# Patient Record
Sex: FEMALE | Race: WHITE | NOT HISPANIC OR LATINO | Employment: OTHER | URBAN - METROPOLITAN AREA
[De-identification: names, ages, dates, MRNs, and addresses within clinical notes are randomized per-mention and may not be internally consistent; named-entity substitution may affect disease eponyms.]

---

## 2017-05-02 ENCOUNTER — HOSPITAL ENCOUNTER (OUTPATIENT)
Facility: HOSPITAL | Age: 77
Setting detail: HOSPITAL OUTPATIENT SURGERY
Discharge: HOME | End: 2017-05-02
Attending: SURGERY | Admitting: SURGERY

## 2017-05-02 ENCOUNTER — ANESTHESIA EVENT (OUTPATIENT)
Dept: GASTROENTEROLOGY | Facility: HOSPITAL | Age: 77
End: 2017-05-02

## 2017-05-02 ENCOUNTER — ANESTHESIA (OUTPATIENT)
Dept: GASTROENTEROLOGY | Facility: HOSPITAL | Age: 77
End: 2017-05-02

## 2017-05-02 VITALS
WEIGHT: 158 LBS | DIASTOLIC BLOOD PRESSURE: 89 MMHG | BODY MASS INDEX: 25.39 KG/M2 | TEMPERATURE: 97.9 F | RESPIRATION RATE: 18 BRPM | HEIGHT: 66 IN | HEART RATE: 63 BPM | OXYGEN SATURATION: 100 % | SYSTOLIC BLOOD PRESSURE: 162 MMHG

## 2017-05-02 PROCEDURE — 25010000002 PROPOFOL 10 MG/ML EMULSION: Performed by: ANESTHESIOLOGY

## 2017-05-02 RX ORDER — PROPOFOL 10 MG/ML
VIAL (ML) INTRAVENOUS AS NEEDED
Status: DISCONTINUED | OUTPATIENT
Start: 2017-05-02 | End: 2017-05-02 | Stop reason: SURG

## 2017-05-02 RX ORDER — PROPOFOL 10 MG/ML
VIAL (ML) INTRAVENOUS CONTINUOUS PRN
Status: DISCONTINUED | OUTPATIENT
Start: 2017-05-02 | End: 2017-05-02 | Stop reason: SURG

## 2017-05-02 RX ORDER — SODIUM CHLORIDE, SODIUM LACTATE, POTASSIUM CHLORIDE, CALCIUM CHLORIDE 600; 310; 30; 20 MG/100ML; MG/100ML; MG/100ML; MG/100ML
1000 INJECTION, SOLUTION INTRAVENOUS CONTINUOUS PRN
Status: DISCONTINUED | OUTPATIENT
Start: 2017-05-02 | End: 2017-05-02 | Stop reason: HOSPADM

## 2017-05-02 RX ORDER — LIDOCAINE HYDROCHLORIDE 20 MG/ML
INJECTION, SOLUTION INFILTRATION; PERINEURAL AS NEEDED
Status: DISCONTINUED | OUTPATIENT
Start: 2017-05-02 | End: 2017-05-02 | Stop reason: SURG

## 2017-05-02 RX ADMIN — LIDOCAINE HYDROCHLORIDE 50 MG: 20 INJECTION, SOLUTION INFILTRATION; PERINEURAL at 09:59

## 2017-05-02 RX ADMIN — SODIUM CHLORIDE, POTASSIUM CHLORIDE, SODIUM LACTATE AND CALCIUM CHLORIDE 1000 ML: 600; 310; 30; 20 INJECTION, SOLUTION INTRAVENOUS at 09:13

## 2017-05-02 RX ADMIN — PROPOFOL 125 MCG/KG/MIN: 10 INJECTION, EMULSION INTRAVENOUS at 10:05

## 2017-05-02 RX ADMIN — PROPOFOL 75 MG: 10 INJECTION, EMULSION INTRAVENOUS at 10:05

## 2017-05-18 ENCOUNTER — TRANSCRIBE ORDERS (OUTPATIENT)
Dept: ADMINISTRATIVE | Facility: HOSPITAL | Age: 77
End: 2017-05-18

## 2017-05-18 DIAGNOSIS — Z78.0 POSTMENOPAUSAL: Primary | ICD-10-CM

## 2017-06-06 ENCOUNTER — HOSPITAL ENCOUNTER (OUTPATIENT)
Dept: BONE DENSITY | Facility: HOSPITAL | Age: 77
Discharge: HOME | End: 2017-06-06
Attending: INTERNAL MEDICINE | Admitting: INTERNAL MEDICINE

## 2017-06-06 DIAGNOSIS — Z78.0 POSTMENOPAUSAL: ICD-10-CM

## 2017-06-06 PROCEDURE — 77080 DXA BONE DENSITY AXIAL: CPT

## 2019-06-03 ENCOUNTER — HOSPITAL ENCOUNTER (EMERGENCY)
Facility: HOSPITAL | Age: 79
Discharge: HOME | End: 2019-06-03
Attending: EMERGENCY MEDICINE | Admitting: EMERGENCY MEDICINE

## 2019-06-03 VITALS
RESPIRATION RATE: 16 BRPM | SYSTOLIC BLOOD PRESSURE: 119 MMHG | OXYGEN SATURATION: 95 % | HEART RATE: 69 BPM | TEMPERATURE: 98.1 F | BODY MASS INDEX: 25.66 KG/M2 | HEIGHT: 66 IN | DIASTOLIC BLOOD PRESSURE: 76 MMHG

## 2019-06-03 DIAGNOSIS — R09.89 FOREIGN BODY SENSATION IN THROAT: Primary | ICD-10-CM

## 2019-06-03 PROCEDURE — 99283 EMERGENCY DEPT VISIT LOW MDM: CPT

## 2019-06-04 NOTE — DISCHARGE INSTRUCTIONS
Continue current home medications  Soft diet for the next 2 days   Follow up with pmd   Return to er for fever, chills, vomiting, inability to handle po fluids, shortness of air or any new or worsening symptoms

## 2019-06-04 NOTE — ED PROVIDER NOTES
"  EMERGENCY DEPARTMENT ENCOUNTER    CHIEF COMPLAINT  Chief Complaint: trouble swallowing   History given by: patient,   History limited by:nothing   Time Seen: 2213  Room Number: 28/28  PMD: Anyi Bartholomew MD      HPI:  Pt is a 79 y.o. female who presents with trouble swallowing that occurred tonight around 2030 after she ate a piece of steak. Pt states \"I knew the piece was too big and I took a bite anyway\".  Per pt's , prior to arrival she was unable to swallow anything. Patient denies CP, SOA, fever, and chills. There are no other complaints at this time.   Past Medical History of GIST, elevated cholesterol, murmur, cataract, gastric reflux, hiatal hernia, diverticular disease, and arthritis.    Duration: 2 hours   Timing: sudden   Location: throat   Radiation: n/a  Quality: \"I knew the piece was too big and I took a bite anyway\"   Intensity/Severity: moderate   Progression: unchanged   Associated Symptoms: none mentioned   Aggravating Factors: per pt's , prior to arrival pt was unable to swallow anything  Alleviating Factors: none mentioned   Previous Episodes: none  Treatment before arrival: none     PAST MEDICAL HISTORY  Active Ambulatory Problems     Diagnosis Date Noted   • Primary localized osteoarthritis of right knee 08/10/2016     Resolved Ambulatory Problems     Diagnosis Date Noted   • No Resolved Ambulatory Problems     Past Medical History:   Diagnosis Date   • Arthritis    • Cataract    • Diverticular disease    • Elevated cholesterol    • Gastric reflux    • GIST (gastrointestinal stromal tumor), malignant (CMS/HCC)    • Hiatal hernia    • Insomnia    • Knee pain, right    • Murmur    • Pinched nerve        PAST SURGICAL HISTORY  Past Surgical History:   Procedure Laterality Date   • COLONOSCOPY      2012   • COLONOSCOPY N/A 5/2/2017    Procedure: COLONOSCOPY to cecum;  Surgeon: Gilmer Shen MD;  Location: [facility] ENDOSCOPY;  Service:    • ENDOSCOPY     • EYE SURGERY   "   • HYSTERECTOMY     • KNEE ARTHROSCOPY Right     REPAIR OF TORN MENISCUS   • MO TOTAL KNEE ARTHROPLASTY Right 8/10/2016    Procedure: RT TOTAL KNEE ARTHROPLASTY;  Surgeon: Nima Amaya MD;  Location: [facility];  Service: Orthopedics   • TEETH EXTRACTION     • TONSILLECTOMY     • TUMOR EXCISION      MALIGNANT GIST TUMOR REMOVED   • VAGINAL DELIVERY      x3       FAMILY HISTORY  No family history on file.    SOCIAL HISTORY  Social History     Socioeconomic History   • Marital status:      Spouse name: Not on file   • Number of children: Not on file   • Years of education: Not on file   • Highest education level: Not on file   Tobacco Use   • Smoking status: Never Smoker   • Smokeless tobacco: Never Used   Substance and Sexual Activity   • Alcohol use: Yes     Comment: 2 GLASSES NIGHTLY   • Drug use: No   • Sexual activity: Defer         ALLERGIES  Penicillins    REVIEW OF SYSTEMS  Review of Systems   Constitutional: Negative for chills and fever.   HENT: Positive for trouble swallowing. Negative for sore throat.    Respiratory: Negative for cough and shortness of breath.    Cardiovascular: Negative for chest pain.   Gastrointestinal: Negative for nausea and vomiting.   Genitourinary: Negative for dysuria.   Musculoskeletal: Negative for back pain.   Psychiatric/Behavioral: The patient is not nervous/anxious.        PHYSICAL EXAM  ED Triage Vitals   Temp Heart Rate Resp BP SpO2   06/03/19 2159 06/03/19 2159 06/03/19 2159 06/03/19 2208 06/03/19 2159   97.9 °F (36.6 °C) 103 17 142/83 95 %         Physical Exam   Constitutional: She is well-developed, well-nourished, and in no distress.   Pt is speaking in full sentences and handling secretions.   HENT:   Head: Normocephalic.   Mouth/Throat: Mucous membranes are normal.   Eyes: No scleral icterus.   Neck: Normal range of motion.   Cardiovascular: Normal rate, regular rhythm and normal heart sounds.   Pulmonary/Chest: Effort normal and breath sounds  "normal.   Musculoskeletal: Normal range of motion.   Neurological: She is alert.   Skin: Skin is warm and dry.   Psychiatric: Mood and affect normal.   Nursing note and vitals reviewed.      MEDICAL RECORD REVIEW  Pt's previous medical records show pt was seen at [facility] on 5/14/2019 and dx with URTI.       PROGRESS AND CONSULTS    2214  Pt is handling secretions w/o any issues.    2234  Rechecked pt. Pt states she feels fine now. Advised pt to RTER if any new/worsening symptoms develop. Discussed the plan to discharge. Pt understands and agrees with the plan, all questions answered.    2245  Reviewed pt's history and workup with Dr. Davila (ER physician).  At bedside evaluation, they agree with the plan of care.    Reviewed implications of results, diagnosis, meds, responsibility to follow up, warning signs and symptoms of possible worsening, potential complications and reasons to return to ER with patient.  Discussed all results and noted any abnormalities with patient.  Discussed absolute need to recheck abnormalities with PMD.    Discussed plan for discharge, as there is no emergent indication for admission.  Pt is agreeable and understands need for follow up and repeat testing.  Pt is aware that discharge does not mean that nothing is wrong but it indicates no emergency is present.  Pt is discharged with instructions to follow up with primary care doctor to have their blood pressure rechecked.       DIAGNOSIS  Final diagnoses:   Foreign body sensation in throat       FOLLOW UP   Anyi Bartholomew MD  [facility] [place] [address]  [phone]    In 1 day          MEDICATIONS GIVEN IN ER  Medications - No data to display    Pulse 103   Temp 97.9 °F (36.6 °C) (Tympanic)   Resp 17   Ht 167.6 cm (66\")   SpO2 95%   BMI 25.66 kg/m²       I personally reviewed the past medical history, past surgical history, social history, family history, current medications and allergies as they appear in this " chart.  The scribe's note accurately reflects the work and decisions made by me.     Documentation assistance provided by jazmine Barrios for JOSE Wright on 6/3/2019 at 10:49 PM. Information recorded by the scribe was done at my direction and has been verified and validated by me.       Deann Barrios  06/03/19 [id number]       Jessica Crowe APRN  06/03/19 6147

## 2019-06-04 NOTE — ED PROVIDER NOTES
Pt presents to the ED complaining of food bolus in throat that began at dinner tonight. Per pt, she thought she had a piece of steak stuck in throat and was unable to tolerate PO. Pt denies SOA, states symptoms have since resolved, but she now has a sore throat.     On exam, pt is resting comfortably in NAD, able to tolerate PO clear and secretions.     I agree with midlevel plan to discharge pt due to pt's ability to tolerate PO and resolve of food bolus. Pt instructed to rest esophagus and stick to soft foods. Pt given RTER instructions and I agreed to have pt follow up with GI for endoscopy within the month. Pt understands and agrees with the plan, all questions answered.    The CLAIR and I have discussed this patient's history, physical exam, and treatment plan.  I have reviewed the documentation and personally had a face to face interaction with the patient. I affirm the documentation and agree with the treatment and plan.  The attached note describes my personal findings.    Documentation assistance provided by jazmine Trammell for Dr. Davila.  Information recorded by the jazmine was done at my direction and has been verified and validated by me.       Jessica Trammell  06/03/19 8619       Nima Davila MD  06/03/19 [id number]

## 2019-06-04 NOTE — ED NOTES
Pt states she believes a piece of steak is in her throat, ate around 2030. Airway patent, no SOA, unable to swallow water.      Kyara Florentino RN  06/03/19 [id number]

## 2019-07-10 ENCOUNTER — OFFICE (OUTPATIENT)
Dept: URBAN - METROPOLITAN AREA CLINIC 75 | Facility: CLINIC | Age: 79
End: 2019-07-10

## 2019-07-10 VITALS
HEIGHT: 66 IN | DIASTOLIC BLOOD PRESSURE: 60 MMHG | WEIGHT: 160 LBS | HEART RATE: 90 BPM | SYSTOLIC BLOOD PRESSURE: 140 MMHG

## 2019-07-10 DIAGNOSIS — R13.10 DYSPHAGIA, UNSPECIFIED: ICD-10-CM

## 2019-07-10 PROCEDURE — 99204 OFFICE O/P NEW MOD 45 MIN: CPT | Performed by: INTERNAL MEDICINE

## 2019-07-11 VITALS
DIASTOLIC BLOOD PRESSURE: 84 MMHG | RESPIRATION RATE: 11 BRPM | RESPIRATION RATE: 22 BRPM | HEART RATE: 87 BPM | OXYGEN SATURATION: 98 % | OXYGEN SATURATION: 94 % | TEMPERATURE: 96.9 F | OXYGEN SATURATION: 95 % | DIASTOLIC BLOOD PRESSURE: 86 MMHG | OXYGEN SATURATION: 96 % | HEART RATE: 37 BPM | HEART RATE: 66 BPM | SYSTOLIC BLOOD PRESSURE: 144 MMHG | WEIGHT: 158 LBS | SYSTOLIC BLOOD PRESSURE: 154 MMHG | RESPIRATION RATE: 16 BRPM | TEMPERATURE: 97.6 F | SYSTOLIC BLOOD PRESSURE: 148 MMHG | DIASTOLIC BLOOD PRESSURE: 94 MMHG | HEART RATE: 62 BPM | HEART RATE: 65 BPM | RESPIRATION RATE: 12 BRPM | HEIGHT: 66 IN | OXYGEN SATURATION: 100 % | DIASTOLIC BLOOD PRESSURE: 80 MMHG | HEART RATE: 81 BPM | RESPIRATION RATE: 14 BRPM

## 2019-07-12 ENCOUNTER — OFFICE (OUTPATIENT)
Dept: URBAN - METROPOLITAN AREA PATHOLOGY 4 | Facility: PATHOLOGY | Age: 79
End: 2019-07-12

## 2019-07-12 ENCOUNTER — AMBULATORY SURGICAL CENTER (OUTPATIENT)
Dept: URBAN - METROPOLITAN AREA SURGERY 17 | Facility: SURGERY | Age: 79
End: 2019-07-12

## 2019-07-12 DIAGNOSIS — K21.0 GASTRO-ESOPHAGEAL REFLUX DISEASE WITH ESOPHAGITIS: ICD-10-CM

## 2019-07-12 DIAGNOSIS — K20.0 EOSINOPHILIC ESOPHAGITIS: ICD-10-CM

## 2019-07-12 DIAGNOSIS — K22.2 ESOPHAGEAL OBSTRUCTION: ICD-10-CM

## 2019-07-12 DIAGNOSIS — K44.9 DIAPHRAGMATIC HERNIA WITHOUT OBSTRUCTION OR GANGRENE: ICD-10-CM

## 2019-07-12 DIAGNOSIS — R13.10 DYSPHAGIA, UNSPECIFIED: ICD-10-CM

## 2019-07-12 PROBLEM — K20.9 ESOPHAGITIS, UNSPECIFIED: Status: ACTIVE | Noted: 2019-07-12

## 2019-07-12 LAB
GI HISTOLOGY: A. SELECT: (no result)
GI HISTOLOGY: B. UNSPECIFIED: (no result)
GI HISTOLOGY: PDF REPORT: (no result)

## 2019-07-12 PROCEDURE — 88312 SPECIAL STAINS GROUP 1: CPT | Performed by: INTERNAL MEDICINE

## 2019-07-12 PROCEDURE — 88305 TISSUE EXAM BY PATHOLOGIST: CPT | Performed by: INTERNAL MEDICINE

## 2019-07-12 PROCEDURE — 43239 EGD BIOPSY SINGLE/MULTIPLE: CPT | Performed by: INTERNAL MEDICINE

## 2020-01-14 ENCOUNTER — APPOINTMENT (OUTPATIENT)
Dept: PREADMISSION TESTING | Facility: HOSPITAL | Age: 80
End: 2020-01-14

## 2020-01-14 VITALS
OXYGEN SATURATION: 98 % | HEIGHT: 67 IN | DIASTOLIC BLOOD PRESSURE: 69 MMHG | WEIGHT: 165 LBS | SYSTOLIC BLOOD PRESSURE: 149 MMHG | HEART RATE: 66 BPM | RESPIRATION RATE: 16 BRPM | BODY MASS INDEX: 25.9 KG/M2 | TEMPERATURE: 98 F

## 2020-01-14 LAB
ANION GAP SERPL CALCULATED.3IONS-SCNC: 14.8 MMOL/L (ref 5–15)
BASOPHILS # BLD AUTO: 0.04 10*3/MM3 (ref 0–0.2)
BASOPHILS NFR BLD AUTO: 0.6 % (ref 0–1.5)
BUN BLD-MCNC: 19 MG/DL (ref 8–23)
BUN/CREAT SERPL: 22.4 (ref 7–25)
CALCIUM SPEC-SCNC: 9.6 MG/DL (ref 8.6–10.5)
CHLORIDE SERPL-SCNC: 100 MMOL/L (ref 98–107)
CO2 SERPL-SCNC: 24.2 MMOL/L (ref 22–29)
CREAT BLD-MCNC: 0.85 MG/DL (ref 0.57–1)
DEPRECATED RDW RBC AUTO: 42.7 FL (ref 37–54)
EOSINOPHIL # BLD AUTO: 0.24 10*3/MM3 (ref 0–0.4)
EOSINOPHIL NFR BLD AUTO: 3.4 % (ref 0.3–6.2)
ERYTHROCYTE [DISTWIDTH] IN BLOOD BY AUTOMATED COUNT: 12.4 % (ref 12.3–15.4)
GFR SERPL CREATININE-BSD FRML MDRD: 64 ML/MIN/1.73
GLUCOSE BLD-MCNC: 103 MG/DL (ref 65–99)
HCT VFR BLD AUTO: 39.4 % (ref 34–46.6)
HGB BLD-MCNC: 13.2 G/DL (ref 12–15.9)
IMM GRANULOCYTES # BLD AUTO: 0.03 10*3/MM3 (ref 0–0.05)
IMM GRANULOCYTES NFR BLD AUTO: 0.4 % (ref 0–0.5)
LYMPHOCYTES # BLD AUTO: 2.53 10*3/MM3 (ref 0.7–3.1)
LYMPHOCYTES NFR BLD AUTO: 35.8 % (ref 19.6–45.3)
MCH RBC QN AUTO: 31.4 PG (ref 26.6–33)
MCHC RBC AUTO-ENTMCNC: 33.5 G/DL (ref 31.5–35.7)
MCV RBC AUTO: 93.8 FL (ref 79–97)
MONOCYTES # BLD AUTO: 0.78 10*3/MM3 (ref 0.1–0.9)
MONOCYTES NFR BLD AUTO: 11 % (ref 5–12)
NEUTROPHILS # BLD AUTO: 3.44 10*3/MM3 (ref 1.7–7)
NEUTROPHILS NFR BLD AUTO: 48.8 % (ref 42.7–76)
NRBC BLD AUTO-RTO: 0 /100 WBC (ref 0–0.2)
PLATELET # BLD AUTO: 226 10*3/MM3 (ref 140–450)
PMV BLD AUTO: 9.5 FL (ref 6–12)
POTASSIUM BLD-SCNC: 4.1 MMOL/L (ref 3.5–5.2)
RBC # BLD AUTO: 4.2 10*6/MM3 (ref 3.77–5.28)
SODIUM BLD-SCNC: 139 MMOL/L (ref 136–145)
WBC NRBC COR # BLD: 7.06 10*3/MM3 (ref 3.4–10.8)

## 2020-01-14 PROCEDURE — 85025 COMPLETE CBC W/AUTO DIFF WBC: CPT | Performed by: OBSTETRICS & GYNECOLOGY

## 2020-01-14 PROCEDURE — 93005 ELECTROCARDIOGRAM TRACING: CPT

## 2020-01-14 PROCEDURE — 80048 BASIC METABOLIC PNL TOTAL CA: CPT | Performed by: OBSTETRICS & GYNECOLOGY

## 2020-01-14 PROCEDURE — 36415 COLL VENOUS BLD VENIPUNCTURE: CPT

## 2020-01-14 PROCEDURE — 93010 ELECTROCARDIOGRAM REPORT: CPT | Performed by: INTERNAL MEDICINE

## 2020-01-14 RX ORDER — ASPIRIN 325 MG
325 TABLET ORAL AS NEEDED
COMMUNITY

## 2020-01-14 RX ORDER — VIT A/VIT C/VIT E/ZINC/COPPER 2148-113
1 TABLET ORAL EVERY MORNING
COMMUNITY

## 2020-01-14 NOTE — DISCHARGE INSTRUCTIONS
Take the following medications the morning of surgery:    NONE    ARRIVE AT 5:45    General Instructions:  • Do not eat solid food after midnight the night before surgery.  • You may drink clear liquids day of surgery but must stop at least one hour before your hospital arrival time.  • It is beneficial for you to have a clear drink that contains carbohydrates the day of surgery.  We suggest a 12 to 20 ounce bottle of Gatorade or Powerade for non-diabetic patients or a 12 to 20 ounce bottle of G2 or Powerade Zero for diabetic patients. (Pediatric patients, are not advised to drink a 12 to 20 ounce carbohydrate drink)    Clear liquids are liquids you can see through.  Nothing red in color.     Plain water                               Sports drinks  Sodas                                   Gelatin (Jell-O)  Fruit juices without pulp such as white grape juice and apple juice  Popsicles that contain no fruit or yogurt  Tea or coffee (no cream or milk added)  Gatorade / Powerade  G2 / Powerade Zero    • Infants may have breast milk up to four hours before surgery.  • Infants drinking formula may drink formula up to six hours before surgery.   • Patients who avoid smoking, chewing tobacco and alcohol for 4 weeks prior to surgery have a reduced risk of post-operative complications.  Quit smoking as many days before surgery as you can.  • Do not smoke, use chewing tobacco or drink alcohol the day of surgery.   • If applicable bring your C-PAP/ BI-PAP machine.  • Bring any papers given to you in the doctor’s office.  • Wear clean comfortable clothes.  • Do not wear contact lenses, false eyelashes or make-up.  Bring a case for your glasses.   • Bring crutches or walker if applicable.  • Remove all piercings.  Leave jewelry and any other valuables at home.  • Hair extensions with metal clips must be removed prior to surgery.  • The Pre-Admission Testing nurse will instruct you to bring medications if unable to obtain an  accurate list in Pre-Admission Testing.        If you were given a blood bank ID arm band remember to bring it with you the day of surgery.    Preventing a Surgical Site Infection:  • For 2 to 3 days before surgery, avoid shaving with a razor because the razor can irritate skin and make it easier to develop an infection.    • Any areas of open skin can increase the risk of a post-operative wound infection by allowing bacteria to enter and travel throughout the body.  Notify your surgeon if you have any skin wounds / rashes even if it is not near the expected surgical site.  The area will need assessed to determine if surgery should be delayed until it is healed.  • The night prior to surgery sleep in a clean bed with clean clothing.  Do not allow pets to sleep with you.  • Shower on the morning of surgery using a fresh bar of anti-bacterial soap (such as Dial) and clean washcloth.  Dry with a clean towel and dress in clean clothing.  • Ask your surgeon if you will be receiving antibiotics prior to surgery.  • Make sure you, your family, and all healthcare providers clean their hands with soap and water or an alcohol based hand  before caring for you or your wound.    Day of surgery:  Your arrival time is approximately two hours before your scheduled surgery time.  Upon arrival, a Pre-op nurse and Anesthesiologist will review your health history, obtain vital signs, and answer questions you may have.  The only belongings needed at this time will be a list of your home medications and if applicable your C-PAP/BI-PAP machine.  If you are staying overnight your family can leave the rest of your belongings in the car and bring them to your room later.  A Pre-op nurse will start an IV and you may receive medication in preparation for surgery, including something to help you relax.  Your family will be able to see you in the Pre-op area.  Two visitors at a time will be allowed in the Pre-op room.  While you are in  surgery your family should notify the waiting room  if they leave the waiting room area and provide a contact phone number.    Please be aware that surgery does come with discomfort.  We want to make every effort to control your discomfort so please discuss any uncontrolled symptoms with your nurse.   Your doctor will most likely have prescribed pain medications.      If you are going home after surgery you will receive individualized written care instructions before being discharged.  A responsible adult must drive you to and from the hospital on the day of your surgery and stay with you for 24 hours.    If you are staying overnight following surgery, you will be transported to your hospital room following the recovery period.  [facility] has all private rooms.    If you have any questions please call Pre-Admission Testing at [phone].  Deductibles and co-payments are collected on the day of service. Please be prepared to pay the required co-pay, deductible or deposit on the day of service as defined by your plan.

## 2020-01-20 ENCOUNTER — ANESTHESIA (OUTPATIENT)
Dept: PERIOP | Facility: HOSPITAL | Age: 80
End: 2020-01-20

## 2020-01-20 ENCOUNTER — HOSPITAL ENCOUNTER (OUTPATIENT)
Facility: HOSPITAL | Age: 80
Setting detail: HOSPITAL OUTPATIENT SURGERY
Discharge: HOME | End: 2020-01-20
Attending: OBSTETRICS & GYNECOLOGY | Admitting: OBSTETRICS & GYNECOLOGY

## 2020-01-20 ENCOUNTER — ANESTHESIA EVENT (OUTPATIENT)
Dept: PERIOP | Facility: HOSPITAL | Age: 80
End: 2020-01-20

## 2020-01-20 VITALS
DIASTOLIC BLOOD PRESSURE: 83 MMHG | HEART RATE: 68 BPM | SYSTOLIC BLOOD PRESSURE: 119 MMHG | RESPIRATION RATE: 16 BRPM | TEMPERATURE: 97.9 F | OXYGEN SATURATION: 94 %

## 2020-01-20 DIAGNOSIS — R19.00 PELVIC MASS: ICD-10-CM

## 2020-01-20 PROBLEM — N94.89 ADNEXAL MASS: Status: ACTIVE | Noted: 2020-01-20

## 2020-01-20 PROCEDURE — 25010000002 PROPOFOL 10 MG/ML EMULSION: Performed by: NURSE ANESTHETIST, CERTIFIED REGISTERED

## 2020-01-20 PROCEDURE — 25010000002 HYDROMORPHONE PER 4 MG: Performed by: ANESTHESIOLOGY

## 2020-01-20 PROCEDURE — 88307 TISSUE EXAM BY PATHOLOGIST: CPT | Performed by: OBSTETRICS & GYNECOLOGY

## 2020-01-20 PROCEDURE — 25010000002 FENTANYL CITRATE (PF) 100 MCG/2ML SOLUTION: Performed by: ANESTHESIOLOGY

## 2020-01-20 PROCEDURE — 25010000002 ROPIVACAINE PER 1 MG: Performed by: OBSTETRICS & GYNECOLOGY

## 2020-01-20 PROCEDURE — 88342 IMHCHEM/IMCYTCHM 1ST ANTB: CPT | Performed by: OBSTETRICS & GYNECOLOGY

## 2020-01-20 PROCEDURE — 88341 IMHCHEM/IMCYTCHM EA ADD ANTB: CPT | Performed by: OBSTETRICS & GYNECOLOGY

## 2020-01-20 PROCEDURE — 25010000002 FENTANYL CITRATE (PF) 100 MCG/2ML SOLUTION: Performed by: NURSE ANESTHETIST, CERTIFIED REGISTERED

## 2020-01-20 PROCEDURE — 25010000002 ONDANSETRON PER 1 MG: Performed by: NURSE ANESTHETIST, CERTIFIED REGISTERED

## 2020-01-20 PROCEDURE — 25010000002 DEXAMETHASONE PER 1 MG: Performed by: NURSE ANESTHETIST, CERTIFIED REGISTERED

## 2020-01-20 PROCEDURE — 25010000002 MIDAZOLAM PER 1 MG: Performed by: ANESTHESIOLOGY

## 2020-01-20 RX ORDER — HYDRALAZINE HYDROCHLORIDE 20 MG/ML
5 INJECTION INTRAMUSCULAR; INTRAVENOUS
Status: DISCONTINUED | OUTPATIENT
Start: 2020-01-20 | End: 2020-01-20 | Stop reason: HOSPADM

## 2020-01-20 RX ORDER — ONDANSETRON 2 MG/ML
INJECTION INTRAMUSCULAR; INTRAVENOUS AS NEEDED
Status: DISCONTINUED | OUTPATIENT
Start: 2020-01-20 | End: 2020-01-20 | Stop reason: SURG

## 2020-01-20 RX ORDER — HYDROCODONE BITARTRATE AND ACETAMINOPHEN 7.5; 325 MG/1; MG/1
1 TABLET ORAL ONCE AS NEEDED
Status: COMPLETED | OUTPATIENT
Start: 2020-01-20 | End: 2020-01-20

## 2020-01-20 RX ORDER — ROCURONIUM BROMIDE 10 MG/ML
INJECTION, SOLUTION INTRAVENOUS AS NEEDED
Status: DISCONTINUED | OUTPATIENT
Start: 2020-01-20 | End: 2020-01-20 | Stop reason: SURG

## 2020-01-20 RX ORDER — MIDAZOLAM HYDROCHLORIDE 1 MG/ML
2 INJECTION INTRAMUSCULAR; INTRAVENOUS
Status: DISCONTINUED | OUTPATIENT
Start: 2020-01-20 | End: 2020-01-20 | Stop reason: HOSPADM

## 2020-01-20 RX ORDER — MIDAZOLAM HYDROCHLORIDE 1 MG/ML
1 INJECTION INTRAMUSCULAR; INTRAVENOUS
Status: DISCONTINUED | OUTPATIENT
Start: 2020-01-20 | End: 2020-01-20 | Stop reason: HOSPADM

## 2020-01-20 RX ORDER — PROMETHAZINE HYDROCHLORIDE 25 MG/1
25 TABLET ORAL ONCE AS NEEDED
Status: DISCONTINUED | OUTPATIENT
Start: 2020-01-20 | End: 2020-01-20 | Stop reason: HOSPADM

## 2020-01-20 RX ORDER — FENTANYL CITRATE 50 UG/ML
50 INJECTION, SOLUTION INTRAMUSCULAR; INTRAVENOUS
Status: DISCONTINUED | OUTPATIENT
Start: 2020-01-20 | End: 2020-01-20 | Stop reason: HOSPADM

## 2020-01-20 RX ORDER — FLUMAZENIL 0.1 MG/ML
0.2 INJECTION INTRAVENOUS AS NEEDED
Status: DISCONTINUED | OUTPATIENT
Start: 2020-01-20 | End: 2020-01-20 | Stop reason: HOSPADM

## 2020-01-20 RX ORDER — LIDOCAINE HYDROCHLORIDE 10 MG/ML
0.5 INJECTION, SOLUTION EPIDURAL; INFILTRATION; INTRACAUDAL; PERINEURAL ONCE AS NEEDED
Status: DISCONTINUED | OUTPATIENT
Start: 2020-01-20 | End: 2020-01-20 | Stop reason: HOSPADM

## 2020-01-20 RX ORDER — FENTANYL CITRATE 50 UG/ML
INJECTION, SOLUTION INTRAMUSCULAR; INTRAVENOUS AS NEEDED
Status: DISCONTINUED | OUTPATIENT
Start: 2020-01-20 | End: 2020-01-20 | Stop reason: SURG

## 2020-01-20 RX ORDER — PROMETHAZINE HYDROCHLORIDE 25 MG/ML
6.25 INJECTION, SOLUTION INTRAMUSCULAR; INTRAVENOUS ONCE AS NEEDED
Status: DISCONTINUED | OUTPATIENT
Start: 2020-01-20 | End: 2020-01-20 | Stop reason: HOSPADM

## 2020-01-20 RX ORDER — CEFAZOLIN SODIUM 2 G/100ML
2 INJECTION, SOLUTION INTRAVENOUS ONCE
Status: DISCONTINUED | OUTPATIENT
Start: 2020-01-20 | End: 2020-01-20

## 2020-01-20 RX ORDER — FAMOTIDINE 10 MG/ML
20 INJECTION, SOLUTION INTRAVENOUS ONCE
Status: COMPLETED | OUTPATIENT
Start: 2020-01-20 | End: 2020-01-20

## 2020-01-20 RX ORDER — ONDANSETRON 2 MG/ML
4 INJECTION INTRAMUSCULAR; INTRAVENOUS ONCE AS NEEDED
Status: DISCONTINUED | OUTPATIENT
Start: 2020-01-20 | End: 2020-01-20 | Stop reason: HOSPADM

## 2020-01-20 RX ORDER — HYDROMORPHONE HYDROCHLORIDE 1 MG/ML
0.5 INJECTION, SOLUTION INTRAMUSCULAR; INTRAVENOUS; SUBCUTANEOUS
Status: DISCONTINUED | OUTPATIENT
Start: 2020-01-20 | End: 2020-01-20 | Stop reason: HOSPADM

## 2020-01-20 RX ORDER — SODIUM CHLORIDE, SODIUM LACTATE, POTASSIUM CHLORIDE, CALCIUM CHLORIDE 600; 310; 30; 20 MG/100ML; MG/100ML; MG/100ML; MG/100ML
9 INJECTION, SOLUTION INTRAVENOUS CONTINUOUS
Status: DISCONTINUED | OUTPATIENT
Start: 2020-01-20 | End: 2020-01-20 | Stop reason: HOSPADM

## 2020-01-20 RX ORDER — DEXAMETHASONE SODIUM PHOSPHATE 10 MG/ML
INJECTION INTRAMUSCULAR; INTRAVENOUS AS NEEDED
Status: DISCONTINUED | OUTPATIENT
Start: 2020-01-20 | End: 2020-01-20 | Stop reason: SURG

## 2020-01-20 RX ORDER — EPHEDRINE SULFATE 50 MG/ML
5 INJECTION, SOLUTION INTRAVENOUS ONCE AS NEEDED
Status: DISCONTINUED | OUTPATIENT
Start: 2020-01-20 | End: 2020-01-20 | Stop reason: HOSPADM

## 2020-01-20 RX ORDER — LIDOCAINE HYDROCHLORIDE 20 MG/ML
INJECTION, SOLUTION INFILTRATION; PERINEURAL AS NEEDED
Status: DISCONTINUED | OUTPATIENT
Start: 2020-01-20 | End: 2020-01-20 | Stop reason: SURG

## 2020-01-20 RX ORDER — SODIUM CHLORIDE 0.9 % (FLUSH) 0.9 %
3-10 SYRINGE (ML) INJECTION AS NEEDED
Status: DISCONTINUED | OUTPATIENT
Start: 2020-01-20 | End: 2020-01-20 | Stop reason: HOSPADM

## 2020-01-20 RX ORDER — CLINDAMYCIN PHOSPHATE 600 MG/50ML
600 INJECTION INTRAVENOUS ONCE
Status: COMPLETED | OUTPATIENT
Start: 2020-01-20 | End: 2020-01-20

## 2020-01-20 RX ORDER — FENTANYL CITRATE 50 UG/ML
100 INJECTION, SOLUTION INTRAMUSCULAR; INTRAVENOUS
Status: DISCONTINUED | OUTPATIENT
Start: 2020-01-20 | End: 2020-01-20 | Stop reason: HOSPADM

## 2020-01-20 RX ORDER — SODIUM CHLORIDE 0.9 % (FLUSH) 0.9 %
3 SYRINGE (ML) INJECTION EVERY 12 HOURS SCHEDULED
Status: DISCONTINUED | OUTPATIENT
Start: 2020-01-20 | End: 2020-01-20 | Stop reason: HOSPADM

## 2020-01-20 RX ORDER — PROMETHAZINE HYDROCHLORIDE 25 MG/1
25 SUPPOSITORY RECTAL ONCE AS NEEDED
Status: DISCONTINUED | OUTPATIENT
Start: 2020-01-20 | End: 2020-01-20 | Stop reason: HOSPADM

## 2020-01-20 RX ORDER — PROPOFOL 10 MG/ML
VIAL (ML) INTRAVENOUS AS NEEDED
Status: DISCONTINUED | OUTPATIENT
Start: 2020-01-20 | End: 2020-01-20 | Stop reason: SURG

## 2020-01-20 RX ORDER — ROPIVACAINE HYDROCHLORIDE 5 MG/ML
INJECTION, SOLUTION EPIDURAL; INFILTRATION; PERINEURAL AS NEEDED
Status: DISCONTINUED | OUTPATIENT
Start: 2020-01-20 | End: 2020-01-20 | Stop reason: HOSPADM

## 2020-01-20 RX ORDER — OXYCODONE AND ACETAMINOPHEN 7.5; 325 MG/1; MG/1
1 TABLET ORAL EVERY 4 HOURS PRN
Status: DISCONTINUED | OUTPATIENT
Start: 2020-01-20 | End: 2020-01-20 | Stop reason: HOSPADM

## 2020-01-20 RX ADMIN — MIDAZOLAM 1 MG: 1 INJECTION INTRAMUSCULAR; INTRAVENOUS at 06:46

## 2020-01-20 RX ADMIN — FENTANYL CITRATE 25 MCG: 50 INJECTION INTRAMUSCULAR; INTRAVENOUS at 07:10

## 2020-01-20 RX ADMIN — FENTANYL CITRATE 50 MCG: 50 INJECTION, SOLUTION INTRAMUSCULAR; INTRAVENOUS at 08:18

## 2020-01-20 RX ADMIN — LIDOCAINE HYDROCHLORIDE 100 MG: 20 INJECTION, SOLUTION INFILTRATION; PERINEURAL at 06:56

## 2020-01-20 RX ADMIN — FENTANYL CITRATE 50 MCG: 50 INJECTION, SOLUTION INTRAMUSCULAR; INTRAVENOUS at 08:26

## 2020-01-20 RX ADMIN — ROCURONIUM BROMIDE 40 MG: 10 INJECTION, SOLUTION INTRAVENOUS at 06:56

## 2020-01-20 RX ADMIN — ONDANSETRON HYDROCHLORIDE 4 MG: 2 SOLUTION INTRAMUSCULAR; INTRAVENOUS at 07:43

## 2020-01-20 RX ADMIN — HYDROCODONE BITARTRATE AND ACETAMINOPHEN 1 TABLET: 7.5; 325 TABLET ORAL at 08:18

## 2020-01-20 RX ADMIN — CLINDAMYCIN PHOSPHATE 600 MG: 12 INJECTION, SOLUTION INTRAVENOUS at 07:06

## 2020-01-20 RX ADMIN — SODIUM CHLORIDE, POTASSIUM CHLORIDE, SODIUM LACTATE AND CALCIUM CHLORIDE 9 ML/HR: 600; 310; 30; 20 INJECTION, SOLUTION INTRAVENOUS at 06:25

## 2020-01-20 RX ADMIN — FAMOTIDINE 20 MG: 10 INJECTION INTRAVENOUS at 06:41

## 2020-01-20 RX ADMIN — FENTANYL CITRATE 50 MCG: 50 INJECTION INTRAMUSCULAR; INTRAVENOUS at 06:56

## 2020-01-20 RX ADMIN — HYDROMORPHONE HYDROCHLORIDE 0.5 MG: 1 INJECTION, SOLUTION INTRAMUSCULAR; INTRAVENOUS; SUBCUTANEOUS at 08:35

## 2020-01-20 RX ADMIN — FENTANYL CITRATE 25 MCG: 50 INJECTION INTRAMUSCULAR; INTRAVENOUS at 07:43

## 2020-01-20 RX ADMIN — DEXAMETHASONE SODIUM PHOSPHATE 6 MG: 10 INJECTION INTRAMUSCULAR; INTRAVENOUS at 07:09

## 2020-01-20 RX ADMIN — SUGAMMADEX 150 MG: 100 INJECTION, SOLUTION INTRAVENOUS at 07:49

## 2020-01-20 RX ADMIN — PROPOFOL 160 MG: 10 INJECTION, EMULSION INTRAVENOUS at 06:56

## 2020-01-20 NOTE — ANESTHESIA PREPROCEDURE EVALUATION
Anesthesia Evaluation     Patient summary reviewed and Nursing notes reviewed   history of anesthetic complications (trouble breathing on emergence onetime):  NPO Solid Status: > 8 hours             Airway   Mallampati: IV  TM distance: <3 FB  Neck ROM: full  Difficult intubation highly probable and Small opening  Dental      Pulmonary - negative pulmonary ROS and normal exam   Cardiovascular     Rhythm: irregular  Rate: normal    (+) valvular problems/murmurs murmur,     PE comment: Occasional irregular beat    Neuro/Psych- negative ROS  GI/Hepatic/Renal/Endo    (+)  hiatal hernia, GERD,      Musculoskeletal     Abdominal    Substance History      OB/GYN          Other   arthritis,      ROS/Med Hx Other: Cervical radiculopathy                    Anesthesia Plan    ASA 2     general     intravenous induction     Anesthetic plan, all risks, benefits, and alternatives have been provided, discussed and informed consent has been obtained with: patient.    Plan discussed with CRNA.

## 2020-01-20 NOTE — ANESTHESIA POSTPROCEDURE EVALUATION
Patient: Carol Dominguez    Procedure Summary     Date:  01/20/20 Room / Location:   [facility]    Anesthesia Start:  0652 Anesthesia Stop:  0802    Procedure:  LAPAROSCOPY/BILATERAL SALPINGO-OOPHORECTOMY (Bilateral Abdomen) Diagnosis:      Surgeon:  Jessica Torrez MD Provider:  Chandra Li MD    Anesthesia Type:  general ASA Status:  2          Anesthesia Type: general    Vitals  Vitals Value Taken Time   /66 1/20/2020  8:45 AM   Temp 36.6 °C (97.9 °F) 1/20/2020  8:04 AM   Pulse 65 1/20/2020  8:48 AM   Resp 16 1/20/2020  8:45 AM   SpO2 96 % 1/20/2020  8:48 AM   Vitals shown include unvalidated device data.        Post Anesthesia Care and Evaluation    Patient location during evaluation: PHASE II  Patient participation: complete - patient participated  Level of consciousness: awake  Pain management: adequate  Airway patency: patent  Anesthetic complications: No anesthetic complications    Cardiovascular status: acceptable  Respiratory status: acceptable  Hydration status: acceptable    Comments: /66   Pulse 66   Temp 36.6 °C (97.9 °F) (Oral)   Resp 16   SpO2 95%

## 2020-01-20 NOTE — ANESTHESIA PROCEDURE NOTES
Airway  Urgency: elective    Date/Time: 1/20/2020 7:02 AM  Airway not difficult    General Information and Staff    Patient location during procedure: OR  Anesthesiologist: Chandra Li MD  CRNA: Lauren Guerra CRNA    Indications and Patient Condition  Indications for airway management: airway protection    Preoxygenated: yes (pt pre-O2 with 100% O2)  Mask difficulty assessment: 2 - vent by mask + OA or adjuvant +/- NMBA (easy BMV )    Final Airway Details  Final airway type: endotracheal airway      Successful airway: ETT  Cuffed: yes   Successful intubation technique: direct laryngoscopy  Facilitating devices/methods: intubating stylet  Endotracheal tube insertion site: oral  Blade: CMAC  Blade size: D  ETT size (mm): 7.0  Cormack-Lehane Classification: grade I - full view of glottis  Placement verified by: chest auscultation and capnometry   Cuff volume (mL): 7  Measured from: lips  ETT/EBT  to lips (cm): 21  Number of attempts at approach: 1    Additional Comments  ATOETx1. No change in dentition.

## 2020-01-20 NOTE — OP NOTE
PREOPERATIVE DIAGNOSIS: Enlarging left adnexal mass    POSTOPERATIVE DIAGNOSIS:[] Same    ANESTHESIA: [] Normal endotracheal anesthesia    SURGEON: Jessica Torrez MD    ASSISTANT SURGEON: [] None    PROCEDURE PERFORMED: [] Laprascopic bilateral salpingo-oophorectomy    FINDINGS: [6 centimeter benign-appearing left ovarian cyst]    ESTIMATED BLOOD LOSS: [] 25 cc    COMPLICATIONS: [] None    PROCEDURE: [] After adequate general endotracheal anesthesia patient was prepped and draped in usual fashion for combined vaginal abdominal procedure bladder was drained with straight cath sponge stick was placed in vagina attention was turned to the umbilicus 1 cm incision was made in a vertical fashion 12 mm disposable laparoscopic trocar was passed into the abdominal cavity under direct visualization second third puncture sites were placed in the suprapubic midline region using a 5 mm nondisposable trocar and a right suprapubic region using a 5 mm disposable trocar the rectosigmoid was laying over the left ovarian mass and it was noted to be quite a bit of diverticular disease a fourth puncture site was placed to manipulate the colon off of the ovary 5 mm disposable laparoscopic trocar was used through this the harmonic scalpel was used to take down the infundibulopelvic ligament bilaterally and the remainder of the vascular attachments of the tubo-ovarian complex on each side were taken down with the harmonic scalpel there was some bleeding of the bipolar Kleppinger cautery forceps were used bilaterally to obtain adequate hemostasis FloSeal was placed on each pedicle bowel was peristalsing normally there is no sign of any intra-abdominal or pelvic trauma the tubes and ovaries were put in the Endo Catch sac and brought out through the umbilical incision gas was expelled instruments were removed under direct visualization umbilical fascial incision was closed with 0 Vicryl interrupted figure-of-eight fashion skin incisions  were closed with 4-0 Vicryl in subcuticular fashion 10 cc of half percent or open was instilled at the incisions ~30 cc all sponge instrument needle counts were correct Steri-Strips were placed sponge stick was removed from the vagina and patient was transferred to recovery in stable condition.

## 2020-01-22 LAB
LAB AP CASE REPORT: NORMAL
LAB AP DIAGNOSIS COMMENT: NORMAL
LAB AP SPECIAL STAINS: NORMAL
PATH REPORT.FINAL DX SPEC: NORMAL
PATH REPORT.GROSS SPEC: NORMAL

## 2020-06-25 ENCOUNTER — TRANSCRIBE ORDERS (OUTPATIENT)
Dept: ADMINISTRATIVE | Facility: HOSPITAL | Age: 80
End: 2020-06-25

## 2020-06-25 DIAGNOSIS — Z12.31 VISIT FOR SCREENING MAMMOGRAM: Primary | ICD-10-CM

## 2020-08-17 ENCOUNTER — TRANSCRIBE ORDERS (OUTPATIENT)
Dept: ADMINISTRATIVE | Facility: HOSPITAL | Age: 80
End: 2020-08-17

## 2020-08-17 DIAGNOSIS — M54.16 RIGHT LUMBAR RADICULOPATHY: Primary | ICD-10-CM

## 2020-08-19 ENCOUNTER — HOSPITAL ENCOUNTER (OUTPATIENT)
Dept: MAMMOGRAPHY | Facility: HOSPITAL | Age: 80
Discharge: HOME | End: 2020-08-19
Admitting: INTERNAL MEDICINE

## 2020-08-19 DIAGNOSIS — Z12.31 VISIT FOR SCREENING MAMMOGRAM: ICD-10-CM

## 2020-08-19 PROCEDURE — 77067 SCR MAMMO BI INCL CAD: CPT

## 2020-08-19 PROCEDURE — 77063 BREAST TOMOSYNTHESIS BI: CPT

## 2020-09-01 ENCOUNTER — HOSPITAL ENCOUNTER (OUTPATIENT)
Dept: MRI IMAGING | Facility: HOSPITAL | Age: 80
Discharge: HOME | End: 2020-09-01
Admitting: INTERNAL MEDICINE

## 2020-09-01 DIAGNOSIS — M54.16 RIGHT LUMBAR RADICULOPATHY: ICD-10-CM

## 2020-09-01 LAB — CREAT BLDA-MCNC: 0.8 MG/DL (ref 0.6–1.3)

## 2020-09-01 PROCEDURE — 72158 MRI LUMBAR SPINE W/O & W/DYE: CPT

## 2020-09-01 PROCEDURE — A9575 INJ GADOTERATE MEGLUMI 0.1ML: HCPCS | Performed by: INTERNAL MEDICINE

## 2020-09-01 PROCEDURE — 82565 ASSAY OF CREATININE: CPT

## 2020-09-01 PROCEDURE — 25010000002 GADOTERATE MEGLUMINE 7.5 MMOL/15ML SOLUTION: Performed by: INTERNAL MEDICINE

## 2020-09-01 RX ORDER — NAPROXEN SODIUM 220 MG
220 TABLET ORAL 2 TIMES DAILY WITH MEALS
COMMUNITY

## 2020-09-01 RX ORDER — GADOTERATE MEGLUMINE 376.9 MG/ML
15 INJECTION INTRAVENOUS
Status: COMPLETED | OUTPATIENT
Start: 2020-09-01 | End: 2020-09-01

## 2020-09-01 RX ADMIN — GADOTERATE MEGLUMINE 15 ML: 376.9 INJECTION, SOLUTION INTRAVENOUS at 15:48

## 2021-03-09 DIAGNOSIS — Z23 IMMUNIZATION DUE: ICD-10-CM

## 2021-08-03 ENCOUNTER — TRANSCRIBE ORDERS (OUTPATIENT)
Dept: ADMINISTRATIVE | Facility: HOSPITAL | Age: 81
End: 2021-08-03

## 2021-08-03 DIAGNOSIS — Z12.31 VISIT FOR SCREENING MAMMOGRAM: Primary | ICD-10-CM

## 2021-09-13 ENCOUNTER — HOSPITAL ENCOUNTER (OUTPATIENT)
Dept: CARDIOLOGY | Facility: HOSPITAL | Age: 81
Discharge: HOME | End: 2021-09-13
Admitting: INTERNAL MEDICINE

## 2021-09-13 ENCOUNTER — TRANSCRIBE ORDERS (OUTPATIENT)
Dept: ADMINISTRATIVE | Facility: HOSPITAL | Age: 81
End: 2021-09-13

## 2021-09-13 DIAGNOSIS — M79.661 RIGHT CALF PAIN: ICD-10-CM

## 2021-09-13 DIAGNOSIS — R22.41 LOCALIZED SWELLING OF RIGHT LOWER EXTREMITY: ICD-10-CM

## 2021-09-13 DIAGNOSIS — M79.661 RIGHT CALF PAIN: Primary | ICD-10-CM

## 2021-09-13 LAB
BH CV LOWER VASCULAR LEFT COMMON FEMORAL AUGMENT: NORMAL
BH CV LOWER VASCULAR LEFT COMMON FEMORAL COMPETENT: NORMAL
BH CV LOWER VASCULAR LEFT COMMON FEMORAL COMPRESS: NORMAL
BH CV LOWER VASCULAR LEFT COMMON FEMORAL PHASIC: NORMAL
BH CV LOWER VASCULAR LEFT COMMON FEMORAL SPONT: NORMAL
BH CV LOWER VASCULAR RIGHT COMMON FEMORAL AUGMENT: NORMAL
BH CV LOWER VASCULAR RIGHT COMMON FEMORAL COMPETENT: NORMAL
BH CV LOWER VASCULAR RIGHT COMMON FEMORAL COMPRESS: NORMAL
BH CV LOWER VASCULAR RIGHT COMMON FEMORAL PHASIC: NORMAL
BH CV LOWER VASCULAR RIGHT COMMON FEMORAL SPONT: NORMAL
BH CV LOWER VASCULAR RIGHT DISTAL FEMORAL COMPRESS: NORMAL
BH CV LOWER VASCULAR RIGHT GASTRONEMIUS COMPRESS: NORMAL
BH CV LOWER VASCULAR RIGHT GREATER SAPH AK COMPRESS: NORMAL
BH CV LOWER VASCULAR RIGHT GREATER SAPH BK COMPRESS: NORMAL
BH CV LOWER VASCULAR RIGHT LESSER SAPH COMPRESS: NORMAL
BH CV LOWER VASCULAR RIGHT MID FEMORAL AUGMENT: NORMAL
BH CV LOWER VASCULAR RIGHT MID FEMORAL COMPETENT: NORMAL
BH CV LOWER VASCULAR RIGHT MID FEMORAL COMPRESS: NORMAL
BH CV LOWER VASCULAR RIGHT MID FEMORAL PHASIC: NORMAL
BH CV LOWER VASCULAR RIGHT MID FEMORAL SPONT: NORMAL
BH CV LOWER VASCULAR RIGHT PERONEAL COMPRESS: NORMAL
BH CV LOWER VASCULAR RIGHT POPLITEAL AUGMENT: NORMAL
BH CV LOWER VASCULAR RIGHT POPLITEAL COMPETENT: NORMAL
BH CV LOWER VASCULAR RIGHT POPLITEAL COMPRESS: NORMAL
BH CV LOWER VASCULAR RIGHT POPLITEAL PHASIC: NORMAL
BH CV LOWER VASCULAR RIGHT POPLITEAL SPONT: NORMAL
BH CV LOWER VASCULAR RIGHT POSTERIOR TIBIAL COMPRESS: NORMAL
BH CV LOWER VASCULAR RIGHT PROFUNDA FEMORAL COMPRESS: NORMAL
BH CV LOWER VASCULAR RIGHT PROXIMAL FEMORAL COMPRESS: NORMAL
BH CV LOWER VASCULAR RIGHT SAPHENOFEMORAL JUNCTION COMPRESS: NORMAL
BH CV LOWER VASCULAR RIGHT SOLEAL COMPRESS: NORMAL
MAXIMAL PREDICTED HEART RATE: 138 BPM
STRESS TARGET HR: 117 BPM

## 2021-09-13 PROCEDURE — 93971 EXTREMITY STUDY: CPT

## 2021-09-13 NOTE — PROGRESS NOTES
Today's preliminary report.  Right lower extremity venous Doppler is negative for DVT.  Report called to Dr. Bartholomew. Said to give patient results.

## 2021-09-25 ENCOUNTER — HOSPITAL ENCOUNTER (OUTPATIENT)
Dept: MAMMOGRAPHY | Facility: HOSPITAL | Age: 81
Discharge: HOME | End: 2021-09-25
Admitting: INTERNAL MEDICINE

## 2021-09-25 DIAGNOSIS — Z12.31 VISIT FOR SCREENING MAMMOGRAM: ICD-10-CM

## 2021-09-25 PROCEDURE — 77063 BREAST TOMOSYNTHESIS BI: CPT

## 2021-09-25 PROCEDURE — 77067 SCR MAMMO BI INCL CAD: CPT

## 2021-12-28 ENCOUNTER — TRANSCRIBE ORDERS (OUTPATIENT)
Dept: ADMINISTRATIVE | Facility: HOSPITAL | Age: 81
End: 2021-12-28

## 2021-12-28 DIAGNOSIS — U07.1 CLINICAL DIAGNOSIS OF SEVERE ACUTE RESPIRATORY SYNDROME CORONAVIRUS 2 (SARS-COV-2) DISEASE: Primary | ICD-10-CM

## 2021-12-30 PROBLEM — U07.1 CLINICAL DIAGNOSIS OF COVID-19: Status: ACTIVE | Noted: 2021-12-30

## 2021-12-30 RX ORDER — SODIUM CHLORIDE 9 MG/ML
30 INJECTION, SOLUTION INTRAVENOUS ONCE
Status: CANCELLED | OUTPATIENT
Start: 2022-01-02

## 2021-12-30 RX ORDER — DIPHENHYDRAMINE HYDROCHLORIDE 50 MG/ML
50 INJECTION INTRAMUSCULAR; INTRAVENOUS ONCE AS NEEDED
Status: CANCELLED | OUTPATIENT
Start: 2022-01-02

## 2021-12-30 RX ORDER — METHYLPREDNISOLONE SODIUM SUCCINATE 125 MG/2ML
125 INJECTION, POWDER, LYOPHILIZED, FOR SOLUTION INTRAMUSCULAR; INTRAVENOUS AS NEEDED
Status: CANCELLED | OUTPATIENT
Start: 2022-01-02

## 2021-12-30 RX ORDER — EPINEPHRINE 1 MG/ML
0.3 INJECTION, SOLUTION, CONCENTRATE INTRAVENOUS AS NEEDED
Status: CANCELLED | OUTPATIENT
Start: 2022-01-02

## 2021-12-30 RX ORDER — DIPHENHYDRAMINE HCL 50 MG
50 CAPSULE ORAL ONCE AS NEEDED
Status: CANCELLED | OUTPATIENT
Start: 2022-01-02

## 2022-01-02 ENCOUNTER — HOSPITAL ENCOUNTER (OUTPATIENT)
Dept: INFUSION THERAPY | Facility: HOSPITAL | Age: 82
Discharge: HOME | End: 2022-01-02
Admitting: INTERNAL MEDICINE

## 2022-01-02 VITALS
HEART RATE: 56 BPM | TEMPERATURE: 96.8 F | SYSTOLIC BLOOD PRESSURE: 139 MMHG | DIASTOLIC BLOOD PRESSURE: 78 MMHG | OXYGEN SATURATION: 97 % | RESPIRATION RATE: 16 BRPM

## 2022-01-02 DIAGNOSIS — U07.1 CLINICAL DIAGNOSIS OF COVID-19: Primary | ICD-10-CM

## 2022-01-02 PROCEDURE — 96365 THER/PROPH/DIAG IV INF INIT: CPT

## 2022-01-02 PROCEDURE — M0245 HC IV INFUSION, BAMLANIVIMAB AND ETESEVIMAB, 2100 MG: HCPCS | Performed by: INTERNAL MEDICINE

## 2022-01-02 PROCEDURE — 25010000002 INJECTION, BAMLANIVIMAB AND ETESEVIMAB, 2100 MG: Performed by: INTERNAL MEDICINE

## 2022-01-02 RX ORDER — SODIUM CHLORIDE 9 MG/ML
30 INJECTION, SOLUTION INTRAVENOUS ONCE
Status: COMPLETED | OUTPATIENT
Start: 2022-01-02 | End: 2022-01-02

## 2022-01-02 RX ORDER — DIPHENHYDRAMINE HYDROCHLORIDE 50 MG/ML
50 INJECTION INTRAMUSCULAR; INTRAVENOUS ONCE AS NEEDED
Status: DISCONTINUED | OUTPATIENT
Start: 2022-01-02 | End: 2022-01-04 | Stop reason: HOSPADM

## 2022-01-02 RX ORDER — SODIUM CHLORIDE 9 MG/ML
30 INJECTION, SOLUTION INTRAVENOUS ONCE
Status: CANCELLED | OUTPATIENT
Start: 2022-01-02

## 2022-01-02 RX ORDER — DIPHENHYDRAMINE HYDROCHLORIDE 50 MG/ML
50 INJECTION INTRAMUSCULAR; INTRAVENOUS ONCE AS NEEDED
OUTPATIENT
Start: 2022-01-02

## 2022-01-02 RX ORDER — DIPHENHYDRAMINE HCL 25 MG
50 CAPSULE ORAL ONCE AS NEEDED
OUTPATIENT
Start: 2022-01-02

## 2022-01-02 RX ORDER — METHYLPREDNISOLONE SODIUM SUCCINATE 125 MG/2ML
125 INJECTION, POWDER, LYOPHILIZED, FOR SOLUTION INTRAMUSCULAR; INTRAVENOUS AS NEEDED
OUTPATIENT
Start: 2022-01-02

## 2022-01-02 RX ORDER — METHYLPREDNISOLONE SODIUM SUCCINATE 125 MG/2ML
125 INJECTION, POWDER, LYOPHILIZED, FOR SOLUTION INTRAMUSCULAR; INTRAVENOUS AS NEEDED
Status: DISCONTINUED | OUTPATIENT
Start: 2022-01-02 | End: 2022-01-04 | Stop reason: HOSPADM

## 2022-01-02 RX ORDER — DIPHENHYDRAMINE HCL 25 MG
50 CAPSULE ORAL ONCE AS NEEDED
Status: DISCONTINUED | OUTPATIENT
Start: 2022-01-02 | End: 2022-01-04 | Stop reason: HOSPADM

## 2022-01-02 RX ADMIN — SODIUM CHLORIDE: 9 INJECTION, SOLUTION INTRAVENOUS at 09:02

## 2022-01-02 RX ADMIN — SODIUM CHLORIDE 30 ML/HR: 9 INJECTION, SOLUTION INTRAVENOUS at 09:03

## 2022-09-15 ENCOUNTER — TRANSCRIBE ORDERS (OUTPATIENT)
Dept: ADMINISTRATIVE | Facility: HOSPITAL | Age: 82
End: 2022-09-15

## 2022-09-15 DIAGNOSIS — Z12.31 VISIT FOR SCREENING MAMMOGRAM: Primary | ICD-10-CM

## 2022-10-18 ENCOUNTER — APPOINTMENT (OUTPATIENT)
Dept: MAMMOGRAPHY | Facility: HOSPITAL | Age: 82
End: 2022-10-18

## 2022-10-27 ENCOUNTER — HOSPITAL ENCOUNTER (OUTPATIENT)
Dept: MAMMOGRAPHY | Facility: HOSPITAL | Age: 82
Discharge: HOME | End: 2022-10-27
Admitting: INTERNAL MEDICINE

## 2022-10-27 DIAGNOSIS — Z12.31 VISIT FOR SCREENING MAMMOGRAM: ICD-10-CM

## 2022-10-27 PROCEDURE — 77063 BREAST TOMOSYNTHESIS BI: CPT

## 2022-10-27 PROCEDURE — 77067 SCR MAMMO BI INCL CAD: CPT

## 2023-11-16 ENCOUNTER — TRANSCRIBE ORDERS (OUTPATIENT)
Dept: ADMINISTRATIVE | Facility: HOSPITAL | Age: 83
End: 2023-11-16
Payer: MEDICARE

## 2023-11-16 DIAGNOSIS — Z12.31 ENCOUNTER FOR SCREENING MAMMOGRAM FOR BREAST CANCER: Primary | ICD-10-CM

## 2023-12-12 ENCOUNTER — HOSPITAL ENCOUNTER (OUTPATIENT)
Dept: MAMMOGRAPHY | Facility: HOSPITAL | Age: 83
Discharge: HOME | End: 2023-12-12
Admitting: INTERNAL MEDICINE
Payer: MEDICARE

## 2023-12-12 DIAGNOSIS — Z12.31 ENCOUNTER FOR SCREENING MAMMOGRAM FOR BREAST CANCER: ICD-10-CM

## 2023-12-12 PROCEDURE — 77067 SCR MAMMO BI INCL CAD: CPT

## 2023-12-12 PROCEDURE — 77063 BREAST TOMOSYNTHESIS BI: CPT

## 2025-01-03 ENCOUNTER — PRE-ADMISSION TESTING (OUTPATIENT)
Dept: PREADMISSION TESTING | Facility: HOSPITAL | Age: 85
End: 2025-01-03
Payer: MEDICARE

## 2025-01-03 VITALS
SYSTOLIC BLOOD PRESSURE: 155 MMHG | HEIGHT: 66 IN | RESPIRATION RATE: 16 BRPM | DIASTOLIC BLOOD PRESSURE: 96 MMHG | OXYGEN SATURATION: 97 % | WEIGHT: 163 LBS | HEART RATE: 82 BPM | BODY MASS INDEX: 26.2 KG/M2 | TEMPERATURE: 97.8 F

## 2025-01-03 LAB
ANION GAP SERPL CALCULATED.3IONS-SCNC: 8.6 MMOL/L (ref 5–15)
BUN SERPL-MCNC: 23 MG/DL (ref 8–23)
BUN/CREAT SERPL: 23.5 (ref 7–25)
CALCIUM SPEC-SCNC: 9.4 MG/DL (ref 8.6–10.5)
CHLORIDE SERPL-SCNC: 104 MMOL/L (ref 98–107)
CO2 SERPL-SCNC: 25.4 MMOL/L (ref 22–29)
CREAT SERPL-MCNC: 0.98 MG/DL (ref 0.57–1)
DEPRECATED RDW RBC AUTO: 41.9 FL (ref 37–54)
EGFRCR SERPLBLD CKD-EPI 2021: 56.7 ML/MIN/1.73
ERYTHROCYTE [DISTWIDTH] IN BLOOD BY AUTOMATED COUNT: 12.2 % (ref 12.3–15.4)
GLUCOSE SERPL-MCNC: 167 MG/DL (ref 65–99)
HCT VFR BLD AUTO: 40.3 % (ref 34–46.6)
HGB BLD-MCNC: 13.7 G/DL (ref 12–15.9)
MCH RBC QN AUTO: 31.6 PG (ref 26.6–33)
MCHC RBC AUTO-ENTMCNC: 34 G/DL (ref 31.5–35.7)
MCV RBC AUTO: 93.1 FL (ref 79–97)
PLATELET # BLD AUTO: 245 10*3/MM3 (ref 140–450)
PMV BLD AUTO: 9.2 FL (ref 6–12)
POTASSIUM SERPL-SCNC: 3.7 MMOL/L (ref 3.5–5.2)
QT INTERVAL: 427 MS
QTC INTERVAL: 420 MS
RBC # BLD AUTO: 4.33 10*6/MM3 (ref 3.77–5.28)
SODIUM SERPL-SCNC: 138 MMOL/L (ref 136–145)
WBC NRBC COR # BLD AUTO: 8.93 10*3/MM3 (ref 3.4–10.8)

## 2025-01-03 PROCEDURE — 93005 ELECTROCARDIOGRAM TRACING: CPT

## 2025-01-03 PROCEDURE — 80048 BASIC METABOLIC PNL TOTAL CA: CPT

## 2025-01-03 PROCEDURE — 85027 COMPLETE CBC AUTOMATED: CPT

## 2025-01-03 PROCEDURE — 36415 COLL VENOUS BLD VENIPUNCTURE: CPT

## 2025-01-03 RX ORDER — ATORVASTATIN CALCIUM 20 MG/1
20 TABLET, FILM COATED ORAL DAILY
COMMUNITY
Start: 2024-09-11

## 2025-01-03 NOTE — DISCHARGE INSTRUCTIONS
Take the following medications the morning of surgery with a small sip of water:    NONE    If you are on prescription narcotic pain medication to control your pain you may also take that medication the morning of surgery.    General Instructions:  Do not eat or drink anything after midnight the night before surgery.  Infants may have breast milk up to four hours before surgery.  Infants drinking formula may drink formula up to six hours before surgery.   Patients who avoid smoking, chewing tobacco and alcohol for 4 weeks prior to surgery have a reduced risk of post-operative complications.  Quit smoking as many days before surgery as you can.  Do not smoke, use chewing tobacco or drink alcohol the day of surgery.   If applicable bring your C-PAP/ BI-PAP machine in with you to preop day of surgery.  Bring any papers given to you in the doctor’s office.  Wear clean comfortable clothes.  Do not wear contact lenses, false eyelashes or make-up.  Bring a case for your glasses.   Bring crutches or walker if applicable.  Remove all piercings.  Leave jewelry and any other valuables at home.  Hair extensions with metal clips must be removed prior to surgery.  The Pre-Admission Testing nurse will instruct you to bring medications if unable to obtain an accurate list in Pre-Admission Testing.        If you were given a blood bank ID arm band remember to bring it with you the day of surgery.    Preventing a Surgical Site Infection:  For 2 to 3 days before surgery, avoid shaving with a razor because the razor can irritate skin and make it easier to develop an infection.    Any areas of open skin can increase the risk of a post-operative wound infection by allowing bacteria to enter and travel throughout the body.  Notify your surgeon if you have any skin wounds / rashes even if it is not near the expected surgical site.  The area will need assessed to determine if surgery should be delayed until it is healed.  The night prior to  surgery shower using a fresh bar of anti-bacterial soap (such as Dial) and clean washcloth.  Sleep in a clean bed with clean clothing.  Do not allow pets to sleep with you.  Shower on the morning of surgery using a fresh bar of anti-bacterial soap (such as Dial) and clean washcloth.  Dry with a clean towel and dress in clean clothing.  Ask your surgeon if you will be receiving antibiotics prior to surgery.  Make sure you, your family, and all healthcare providers clean their hands with soap and water or an alcohol based hand  before caring for you or your wound.    Day of surgery:  Your arrival time is approximately two hours before your scheduled surgery time.  Please note if you have an early arrival time the surgery doors do not open before 5:00 AM.  Upon arrival, a Pre-op nurse and Anesthesiologist will review your health history, obtain vital signs, and answer questions you may have.  The only belongings needed at this time will be your home medications and if applicable your C-PAP/BI-PAP machine.  A Pre-op nurse will start an IV and you may receive medication in preparation for surgery, including something to help you relax.      Please be aware that surgery does come with discomfort.  We want to make every effort to control your discomfort so please discuss any uncontrolled symptoms with your nurse.   Your doctor will most likely have prescribed pain medications.      If you are going home after surgery you will receive individualized written care instructions before being discharged.  A responsible adult must drive you to and from the hospital on the day of your surgery and ideally stay with you through the night.  Discharge prescriptions can be filled by the hospital pharmacy during regular pharmacy hours.  If you are having surgery late in the day/evening your prescription may be e-prescribed to your pharmacy.  Please verify your pharmacy hours or chose a 24 hour pharmacy to avoid not having  access to your prescription because your pharmacy has closed for the day.    If you are staying overnight following surgery, you will be transported to your hospital room following the recovery period.  [facility] has all private rooms.    If you have any questions please call Pre-Admission Testing at [phone].  Deductibles and co-payments are collected on the day of service. Please be prepared to pay the required co-pay, deductible or deposit on the day of service as defined by your plan.    Call your surgeon immediately if you experience any of the following symptoms:  Sore Throat  Shortness of Breath or difficulty breathing  Cough  Chills  Body soreness or muscle pain  Headache  Fever  New loss of taste or smell  Do not arrive for your surgery ill.  Your procedure will need to be rescheduled to another time.  You will need to call your physician before the day of surgery to avoid any unnecessary exposure to hospital staff as well as other patients.

## 2025-01-09 ENCOUNTER — ANESTHESIA EVENT (OUTPATIENT)
Dept: PERIOP | Facility: HOSPITAL | Age: 85
End: 2025-01-09
Payer: MEDICARE

## 2025-01-10 ENCOUNTER — APPOINTMENT (OUTPATIENT)
Dept: GENERAL RADIOLOGY | Facility: HOSPITAL | Age: 85
End: 2025-01-10
Payer: MEDICARE

## 2025-01-10 ENCOUNTER — HOSPITAL ENCOUNTER (OUTPATIENT)
Facility: HOSPITAL | Age: 85
Setting detail: OBSERVATION
Discharge: HOME | End: 2025-01-11
Attending: UROLOGY | Admitting: UROLOGY
Payer: MEDICARE

## 2025-01-10 ENCOUNTER — ANESTHESIA (OUTPATIENT)
Dept: PERIOP | Facility: HOSPITAL | Age: 85
End: 2025-01-10
Payer: MEDICARE

## 2025-01-10 DIAGNOSIS — U07.1 CLINICAL DIAGNOSIS OF COVID-19: ICD-10-CM

## 2025-01-10 DIAGNOSIS — N20.0 RENAL CALCULUS: Primary | ICD-10-CM

## 2025-01-10 DIAGNOSIS — N20.0 KIDNEY STONE: ICD-10-CM

## 2025-01-10 PROCEDURE — 25010000002 DEXAMETHASONE SODIUM PHOSPHATE 20 MG/5ML SOLUTION: Performed by: STUDENT IN AN ORGANIZED HEALTH CARE EDUCATION/TRAINING PROGRAM

## 2025-01-10 PROCEDURE — A9270 NON-COVERED ITEM OR SERVICE: HCPCS | Performed by: UROLOGY

## 2025-01-10 PROCEDURE — 25010000002 LIDOCAINE 2% SOLUTION: Performed by: STUDENT IN AN ORGANIZED HEALTH CARE EDUCATION/TRAINING PROGRAM

## 2025-01-10 PROCEDURE — G0378 HOSPITAL OBSERVATION PER HR: HCPCS

## 2025-01-10 PROCEDURE — 63710000001 OXYCODONE-ACETAMINOPHEN 5-325 MG TABLET: Performed by: UROLOGY

## 2025-01-10 PROCEDURE — 25810000003 SODIUM CHLORIDE 0.9 % SOLUTION: Performed by: UROLOGY

## 2025-01-10 PROCEDURE — 25810000003 LACTATED RINGERS PER 1000 ML: Performed by: STUDENT IN AN ORGANIZED HEALTH CARE EDUCATION/TRAINING PROGRAM

## 2025-01-10 PROCEDURE — 25010000002 CEFAZOLIN PER 500 MG: Performed by: UROLOGY

## 2025-01-10 PROCEDURE — C1769 GUIDE WIRE: HCPCS | Performed by: UROLOGY

## 2025-01-10 PROCEDURE — 25010000002 PROPOFOL 200 MG/20ML EMULSION: Performed by: STUDENT IN AN ORGANIZED HEALTH CARE EDUCATION/TRAINING PROGRAM

## 2025-01-10 PROCEDURE — 25010000002 PROPOFOL 10 MG/ML EMULSION: Performed by: STUDENT IN AN ORGANIZED HEALTH CARE EDUCATION/TRAINING PROGRAM

## 2025-01-10 PROCEDURE — 25010000002 MAGNESIUM SULFATE PER 500 MG OF MAGNESIUM: Performed by: STUDENT IN AN ORGANIZED HEALTH CARE EDUCATION/TRAINING PROGRAM

## 2025-01-10 PROCEDURE — 82365 CALCULUS SPECTROSCOPY: CPT | Performed by: UROLOGY

## 2025-01-10 PROCEDURE — 63710000001 ATORVASTATIN 20 MG TABLET: Performed by: UROLOGY

## 2025-01-10 PROCEDURE — 25010000002 ONDANSETRON PER 1 MG: Performed by: STUDENT IN AN ORGANIZED HEALTH CARE EDUCATION/TRAINING PROGRAM

## 2025-01-10 PROCEDURE — 25510000001 IOPAMIDOL 61 % SOLUTION: Performed by: UROLOGY

## 2025-01-10 PROCEDURE — 25010000002 FENTANYL CITRATE (PF) 50 MCG/ML SOLUTION: Performed by: STUDENT IN AN ORGANIZED HEALTH CARE EDUCATION/TRAINING PROGRAM

## 2025-01-10 PROCEDURE — 25010000002 SUGAMMADEX 200 MG/2ML SOLUTION: Performed by: STUDENT IN AN ORGANIZED HEALTH CARE EDUCATION/TRAINING PROGRAM

## 2025-01-10 RX ORDER — FENTANYL CITRATE 50 UG/ML
25 INJECTION, SOLUTION INTRAMUSCULAR; INTRAVENOUS
Status: DISCONTINUED | OUTPATIENT
Start: 2025-01-10 | End: 2025-01-10 | Stop reason: HOSPADM

## 2025-01-10 RX ORDER — SODIUM CHLORIDE 9 MG/ML
100 INJECTION, SOLUTION INTRAVENOUS CONTINUOUS
Status: ACTIVE | OUTPATIENT
Start: 2025-01-10 | End: 2025-01-11

## 2025-01-10 RX ORDER — SODIUM CHLORIDE 0.9 % (FLUSH) 0.9 %
3 SYRINGE (ML) INJECTION EVERY 12 HOURS SCHEDULED
Status: DISCONTINUED | OUTPATIENT
Start: 2025-01-10 | End: 2025-01-11 | Stop reason: HOSPADM

## 2025-01-10 RX ORDER — ZOLPIDEM TARTRATE 5 MG/1
5 TABLET ORAL NIGHTLY PRN
Status: DISCONTINUED | OUTPATIENT
Start: 2025-01-10 | End: 2025-01-11 | Stop reason: HOSPADM

## 2025-01-10 RX ORDER — PROPOFOL 10 MG/ML
INJECTION, EMULSION INTRAVENOUS AS NEEDED
Status: DISCONTINUED | OUTPATIENT
Start: 2025-01-10 | End: 2025-01-10 | Stop reason: SURG

## 2025-01-10 RX ORDER — SODIUM CHLORIDE 9 MG/ML
40 INJECTION, SOLUTION INTRAVENOUS AS NEEDED
Status: DISCONTINUED | OUTPATIENT
Start: 2025-01-10 | End: 2025-01-10 | Stop reason: HOSPADM

## 2025-01-10 RX ORDER — NALOXONE HCL 0.4 MG/ML
0.1 VIAL (ML) INJECTION
Status: DISCONTINUED | OUTPATIENT
Start: 2025-01-10 | End: 2025-01-11 | Stop reason: HOSPADM

## 2025-01-10 RX ORDER — HYDROMORPHONE HYDROCHLORIDE 1 MG/ML
0.25 INJECTION, SOLUTION INTRAMUSCULAR; INTRAVENOUS; SUBCUTANEOUS
Status: DISCONTINUED | OUTPATIENT
Start: 2025-01-10 | End: 2025-01-10 | Stop reason: HOSPADM

## 2025-01-10 RX ORDER — FLUMAZENIL 0.1 MG/ML
0.2 INJECTION INTRAVENOUS AS NEEDED
Status: DISCONTINUED | OUTPATIENT
Start: 2025-01-10 | End: 2025-01-10 | Stop reason: HOSPADM

## 2025-01-10 RX ORDER — LIDOCAINE HYDROCHLORIDE 20 MG/ML
INJECTION, SOLUTION INFILTRATION; PERINEURAL AS NEEDED
Status: DISCONTINUED | OUTPATIENT
Start: 2025-01-10 | End: 2025-01-10 | Stop reason: SURG

## 2025-01-10 RX ORDER — SODIUM CHLORIDE 0.9 % (FLUSH) 0.9 %
10 SYRINGE (ML) INJECTION AS NEEDED
Status: DISCONTINUED | OUTPATIENT
Start: 2025-01-10 | End: 2025-01-11 | Stop reason: HOSPADM

## 2025-01-10 RX ORDER — OXYCODONE AND ACETAMINOPHEN 5; 325 MG/1; MG/1
1 TABLET ORAL EVERY 4 HOURS PRN
Qty: 30 TABLET | Refills: 0 | Status: SHIPPED | OUTPATIENT
Start: 2025-01-10 | End: 2025-01-15

## 2025-01-10 RX ORDER — NALOXONE HCL 0.4 MG/ML
0.2 VIAL (ML) INJECTION AS NEEDED
Status: DISCONTINUED | OUTPATIENT
Start: 2025-01-10 | End: 2025-01-10 | Stop reason: HOSPADM

## 2025-01-10 RX ORDER — ATORVASTATIN CALCIUM 20 MG/1
20 TABLET, FILM COATED ORAL DAILY
Status: DISCONTINUED | OUTPATIENT
Start: 2025-01-10 | End: 2025-01-11 | Stop reason: HOSPADM

## 2025-01-10 RX ORDER — ONDANSETRON 2 MG/ML
4 INJECTION INTRAMUSCULAR; INTRAVENOUS EVERY 6 HOURS PRN
Status: DISCONTINUED | OUTPATIENT
Start: 2025-01-10 | End: 2025-01-11 | Stop reason: HOSPADM

## 2025-01-10 RX ORDER — LABETALOL HYDROCHLORIDE 5 MG/ML
5 INJECTION, SOLUTION INTRAVENOUS
Status: DISCONTINUED | OUTPATIENT
Start: 2025-01-10 | End: 2025-01-10 | Stop reason: HOSPADM

## 2025-01-10 RX ORDER — DEXAMETHASONE SODIUM PHOSPHATE 4 MG/ML
INJECTION, SOLUTION INTRA-ARTICULAR; INTRALESIONAL; INTRAMUSCULAR; INTRAVENOUS; SOFT TISSUE AS NEEDED
Status: DISCONTINUED | OUTPATIENT
Start: 2025-01-10 | End: 2025-01-10 | Stop reason: SURG

## 2025-01-10 RX ORDER — MAGNESIUM HYDROXIDE 1200 MG/15ML
LIQUID ORAL AS NEEDED
Status: DISCONTINUED | OUTPATIENT
Start: 2025-01-10 | End: 2025-01-10 | Stop reason: HOSPADM

## 2025-01-10 RX ORDER — ONDANSETRON 2 MG/ML
INJECTION INTRAMUSCULAR; INTRAVENOUS AS NEEDED
Status: DISCONTINUED | OUTPATIENT
Start: 2025-01-10 | End: 2025-01-10 | Stop reason: SURG

## 2025-01-10 RX ORDER — SODIUM CHLORIDE 0.9 % (FLUSH) 0.9 %
10 SYRINGE (ML) INJECTION AS NEEDED
Status: DISCONTINUED | OUTPATIENT
Start: 2025-01-10 | End: 2025-01-10 | Stop reason: HOSPADM

## 2025-01-10 RX ORDER — ULTRASOUND COUPLING MEDIUM
GEL (GRAM) TOPICAL AS NEEDED
Status: DISCONTINUED | OUTPATIENT
Start: 2025-01-10 | End: 2025-01-10 | Stop reason: HOSPADM

## 2025-01-10 RX ORDER — OXYCODONE AND ACETAMINOPHEN 5; 325 MG/1; MG/1
1 TABLET ORAL EVERY 4 HOURS PRN
Status: DISCONTINUED | OUTPATIENT
Start: 2025-01-10 | End: 2025-01-11 | Stop reason: HOSPADM

## 2025-01-10 RX ORDER — HYDROCODONE BITARTRATE AND ACETAMINOPHEN 7.5; 325 MG/1; MG/1
1 TABLET ORAL EVERY 4 HOURS PRN
Status: DISCONTINUED | OUTPATIENT
Start: 2025-01-10 | End: 2025-01-10 | Stop reason: HOSPADM

## 2025-01-10 RX ORDER — CEPHALEXIN 500 MG/1
500 CAPSULE ORAL 2 TIMES DAILY
Qty: 14 CAPSULE | Refills: 0 | Status: SHIPPED | OUTPATIENT
Start: 2025-01-10 | End: 2025-01-17

## 2025-01-10 RX ORDER — ONDANSETRON 2 MG/ML
4 INJECTION INTRAMUSCULAR; INTRAVENOUS ONCE AS NEEDED
Status: DISCONTINUED | OUTPATIENT
Start: 2025-01-10 | End: 2025-01-10 | Stop reason: HOSPADM

## 2025-01-10 RX ORDER — SODIUM CHLORIDE 0.9 % (FLUSH) 0.9 %
10 SYRINGE (ML) INJECTION EVERY 12 HOURS SCHEDULED
Status: DISCONTINUED | OUTPATIENT
Start: 2025-01-10 | End: 2025-01-10 | Stop reason: HOSPADM

## 2025-01-10 RX ORDER — EPHEDRINE SULFATE 50 MG/ML
5 INJECTION, SOLUTION INTRAVENOUS ONCE AS NEEDED
Status: DISCONTINUED | OUTPATIENT
Start: 2025-01-10 | End: 2025-01-10 | Stop reason: HOSPADM

## 2025-01-10 RX ORDER — PROMETHAZINE HYDROCHLORIDE 25 MG/1
25 TABLET ORAL ONCE AS NEEDED
Status: DISCONTINUED | OUTPATIENT
Start: 2025-01-10 | End: 2025-01-10 | Stop reason: HOSPADM

## 2025-01-10 RX ORDER — ROCURONIUM BROMIDE 10 MG/ML
INJECTION, SOLUTION INTRAVENOUS AS NEEDED
Status: DISCONTINUED | OUTPATIENT
Start: 2025-01-10 | End: 2025-01-10 | Stop reason: SURG

## 2025-01-10 RX ORDER — ONDANSETRON 4 MG/1
4 TABLET, ORALLY DISINTEGRATING ORAL EVERY 6 HOURS PRN
Status: DISCONTINUED | OUTPATIENT
Start: 2025-01-10 | End: 2025-01-11 | Stop reason: HOSPADM

## 2025-01-10 RX ORDER — HYDROCODONE BITARTRATE AND ACETAMINOPHEN 5; 325 MG/1; MG/1
1 TABLET ORAL ONCE AS NEEDED
Status: DISCONTINUED | OUTPATIENT
Start: 2025-01-10 | End: 2025-01-10 | Stop reason: HOSPADM

## 2025-01-10 RX ORDER — FENTANYL CITRATE 50 UG/ML
INJECTION, SOLUTION INTRAMUSCULAR; INTRAVENOUS AS NEEDED
Status: DISCONTINUED | OUTPATIENT
Start: 2025-01-10 | End: 2025-01-10 | Stop reason: SURG

## 2025-01-10 RX ORDER — SODIUM CHLORIDE, SODIUM LACTATE, POTASSIUM CHLORIDE, CALCIUM CHLORIDE 600; 310; 30; 20 MG/100ML; MG/100ML; MG/100ML; MG/100ML
9 INJECTION, SOLUTION INTRAVENOUS CONTINUOUS PRN
Status: DISCONTINUED | OUTPATIENT
Start: 2025-01-10 | End: 2025-01-10 | Stop reason: HOSPADM

## 2025-01-10 RX ORDER — HYDRALAZINE HYDROCHLORIDE 20 MG/ML
5 INJECTION INTRAMUSCULAR; INTRAVENOUS
Status: DISCONTINUED | OUTPATIENT
Start: 2025-01-10 | End: 2025-01-10 | Stop reason: HOSPADM

## 2025-01-10 RX ORDER — PROMETHAZINE HYDROCHLORIDE 25 MG/1
25 SUPPOSITORY RECTAL ONCE AS NEEDED
Status: DISCONTINUED | OUTPATIENT
Start: 2025-01-10 | End: 2025-01-10 | Stop reason: HOSPADM

## 2025-01-10 RX ORDER — IPRATROPIUM BROMIDE AND ALBUTEROL SULFATE 2.5; .5 MG/3ML; MG/3ML
3 SOLUTION RESPIRATORY (INHALATION) ONCE AS NEEDED
Status: DISCONTINUED | OUTPATIENT
Start: 2025-01-10 | End: 2025-01-10 | Stop reason: HOSPADM

## 2025-01-10 RX ORDER — SODIUM CHLORIDE 9 MG/ML
40 INJECTION, SOLUTION INTRAVENOUS AS NEEDED
Status: DISCONTINUED | OUTPATIENT
Start: 2025-01-10 | End: 2025-01-11 | Stop reason: HOSPADM

## 2025-01-10 RX ORDER — ATROPINE SULFATE 0.4 MG/ML
0.4 INJECTION, SOLUTION INTRAMUSCULAR; INTRAVENOUS; SUBCUTANEOUS ONCE AS NEEDED
Status: DISCONTINUED | OUTPATIENT
Start: 2025-01-10 | End: 2025-01-10 | Stop reason: HOSPADM

## 2025-01-10 RX ORDER — MAGNESIUM SULFATE HEPTAHYDRATE 500 MG/ML
INJECTION, SOLUTION INTRAMUSCULAR; INTRAVENOUS AS NEEDED
Status: DISCONTINUED | OUTPATIENT
Start: 2025-01-10 | End: 2025-01-10 | Stop reason: SURG

## 2025-01-10 RX ORDER — IOPAMIDOL 612 MG/ML
60 INJECTION, SOLUTION INTRAVASCULAR
Status: COMPLETED | OUTPATIENT
Start: 2025-01-10 | End: 2025-01-10

## 2025-01-10 RX ORDER — DIPHENHYDRAMINE HYDROCHLORIDE 50 MG/ML
12.5 INJECTION INTRAMUSCULAR; INTRAVENOUS
Status: DISCONTINUED | OUTPATIENT
Start: 2025-01-10 | End: 2025-01-10 | Stop reason: HOSPADM

## 2025-01-10 RX ADMIN — PROPOFOL 125 MCG/KG/MIN: 10 INJECTION, EMULSION INTRAVENOUS at 14:34

## 2025-01-10 RX ADMIN — LIDOCAINE HYDROCHLORIDE 60 MG: 20 INJECTION, SOLUTION INFILTRATION; PERINEURAL at 15:43

## 2025-01-10 RX ADMIN — ROCURONIUM BROMIDE 40 MG: 10 INJECTION, SOLUTION INTRAVENOUS at 14:35

## 2025-01-10 RX ADMIN — IOPAMIDOL 38 ML: 612 INJECTION, SOLUTION INTRAVENOUS at 15:25

## 2025-01-10 RX ADMIN — LIDOCAINE HYDROCHLORIDE 60 MG: 20 INJECTION, SOLUTION INFILTRATION; PERINEURAL at 14:34

## 2025-01-10 RX ADMIN — FENTANYL CITRATE 25 MCG: 50 INJECTION, SOLUTION INTRAMUSCULAR; INTRAVENOUS at 15:02

## 2025-01-10 RX ADMIN — SUGAMMADEX 200 MG: 100 INJECTION, SOLUTION INTRAVENOUS at 15:47

## 2025-01-10 RX ADMIN — SODIUM CHLORIDE 100 ML/HR: 9 INJECTION, SOLUTION INTRAVENOUS at 18:16

## 2025-01-10 RX ADMIN — ONDANSETRON 4 MG: 2 INJECTION INTRAMUSCULAR; INTRAVENOUS at 15:43

## 2025-01-10 RX ADMIN — FENTANYL CITRATE 25 MCG: 50 INJECTION, SOLUTION INTRAMUSCULAR; INTRAVENOUS at 15:55

## 2025-01-10 RX ADMIN — OXYCODONE HYDROCHLORIDE AND ACETAMINOPHEN 1 TABLET: 5; 325 TABLET ORAL at 16:53

## 2025-01-10 RX ADMIN — ATORVASTATIN CALCIUM 20 MG: 20 TABLET, FILM COATED ORAL at 18:41

## 2025-01-10 RX ADMIN — MAGNESIUM SULFATE HEPTAHYDRATE 1 G: 500 INJECTION, SOLUTION INTRAMUSCULAR; INTRAVENOUS at 15:05

## 2025-01-10 RX ADMIN — SODIUM CHLORIDE 2000 MG: 900 INJECTION INTRAVENOUS at 14:21

## 2025-01-10 RX ADMIN — OXYCODONE HYDROCHLORIDE AND ACETAMINOPHEN 1 TABLET: 5; 325 TABLET ORAL at 21:15

## 2025-01-10 RX ADMIN — DEXAMETHASONE SODIUM PHOSPHATE 4 MG: 4 INJECTION, SOLUTION INTRAMUSCULAR; INTRAVENOUS at 14:45

## 2025-01-10 RX ADMIN — PROPOFOL 100 MG: 10 INJECTION, EMULSION INTRAVENOUS at 14:34

## 2025-01-10 RX ADMIN — FENTANYL CITRATE 25 MCG: 50 INJECTION, SOLUTION INTRAMUSCULAR; INTRAVENOUS at 14:45

## 2025-01-10 RX ADMIN — SODIUM CHLORIDE, POTASSIUM CHLORIDE, SODIUM LACTATE AND CALCIUM CHLORIDE: 600; 310; 30; 20 INJECTION, SOLUTION INTRAVENOUS at 14:27

## 2025-01-10 RX ADMIN — ROCURONIUM BROMIDE 10 MG: 10 INJECTION, SOLUTION INTRAVENOUS at 15:07

## 2025-01-10 NOTE — ANESTHESIA PREPROCEDURE EVALUATION
Anesthesia Evaluation     Patient summary reviewed   no history of anesthetic complications:   NPO Solid Status: > 8 hours  NPO Liquid Status: > 2 hours           Airway   Mallampati: II  TM distance: >3 FB  Neck ROM: full  Small opening  Dental      Comment: Denies any chipped, cracked, or loose teeth     Pulmonary - normal exam   (+) ,sleep apnea (at risk)  (-) COPD, asthma, not a smoker  Cardiovascular - normal exam  Exercise tolerance: good (4-7 METS)    (+) valvular problems/murmurs murmur, hyperlipidemia  (-) past MI, CAD, dysrhythmias, angina      Neuro/Psych  (-) seizures, TIA, CVA  GI/Hepatic/Renal/Endo    (+) hiatal hernia, GERD, renal disease- stones  (-) liver disease, diabetes, no thyroid disorder    ROS Comment: Diverticulosis    Musculoskeletal     Abdominal    Substance History      OB/GYN          Other   arthritis,   history of cancer (GIST tumor s/p resection/radiation/chemo)                  Anesthesia Plan    ASA 3     general   total IV anesthesia  (Patient requesting TIVA to prevent post-operative cognitive dysfunction    Complications of general anesthesia include but not limited to awareness under anesthesia, nausea, vomiting, sore throat, hoarseness, chipped or cracked teeth, MI, CVA, or serious allergic reaction. )  intravenous induction     Anesthetic plan, risks, benefits, and alternatives have been provided, discussed and informed consent has been obtained with: patient.    Plan discussed with CRNA and attending.    CODE STATUS:

## 2025-01-10 NOTE — H&P
[facility] Surgical History and Physical    Patient Care Team:  Kaitlynn Delgado MD as PCP - General (Internal Medicine)    Chief complaint flank pain    Subjective     Patient is a 84 y.o. female presents with enlarging right renal stone.  I worked her up about 6 years ago for microscopic hematuria and she had some small stones in the right kidney at the time.  She was asymptomatic.  She has recently had an enlarging stone now measuring 1.5 x 2.3 cm in the right renal pelvis.  7 occasional pain.  No recent UTIs.  She is here for percutaneous nephrostolithotomy.  No irritable urinary symptoms at this time.     Review of Systems   Pertinent items are noted in HPI    Past Medical History:   Diagnosis Date    Adnexal mass     Arthritis     Diverticular disease     Elevated cholesterol     Female stress incontinence     GERD (gastroesophageal reflux disease)     GIST (gastrointestinal stromal tumor), malignant     RESECTION. TREATMENT WITH CHEMO 2005    Hiatal hernia     Hip abductor tendonitis, right     History of transfusion     Insomnia     Kidney stones     Murmur     BENIGN. NO ISSUES    Right hip pain      Past Surgical History:   Procedure Laterality Date    COLONOSCOPY      2012    COLONOSCOPY N/A 5/2/2017    Procedure: COLONOSCOPY to cecum;  Surgeon: Gilmer Shen MD;  Location: [facility] ENDOSCOPY;  Service:     DIAGNOSTIC LAPAROSCOPY Bilateral 1/20/2020    Procedure: LAPAROSCOPY/BILATERAL SALPINGO-OOPHORECTOMY;  Surgeon: Jessica Torrez MD;  Location: [facility] OR OSC;  Service: Gynecology    ENDOSCOPY      EYE SURGERY      HYSTERECTOMY      KNEE ARTHROSCOPY Right     REPAIR OF TORN MENISCUS    CO ARTHRP KNE CONDYLE&PLATU MEDIAL&LAT COMPARTMENTS Right 8/10/2016    Procedure: RT TOTAL KNEE ARTHROPLASTY;  Surgeon: Nima Amaya MD;  Location: [facility] MAIN OR;  Service: Orthopedics    TEETH EXTRACTION      TONSILLECTOMY      TUMOR EXCISION      MALIGNANT GIST TUMOR REMOVED    VAGINAL DELIVERY      x3      Family History   Problem Relation Age of Onset    Malig Hyperthermia Neg Hx      Social History     Tobacco Use    Smoking status: Never    Smokeless tobacco: Never   Vaping Use    Vaping status: Never Used   Substance Use Topics    Alcohol use: Yes     Comment: 1 DRINK WINE NIGHT    Drug use: No       Meds:  Medications Prior to Admission   Medication Sig Dispense Refill Last Dose/Taking    aspirin 325 MG tablet Take 325 mg by mouth As Needed for Mild Pain . TO HOLD 1 WEEK BEFORE SURGERY   Patient Taking Differently    atorvastatin (LIPITOR) 20 MG tablet Take 1 tablet by mouth Daily.   1/9/2025    Cholecalciferol 50 MCG (2000 UT) capsule Take  by mouth.   Past Month    Multiple Vitamins-Minerals (PRESERVISION AREDS) tablet Take 2 tablets by mouth Every Morning. TO HOLD 1 WEEK BEFORE SURGERY   Past Month    naproxen sodium (ALEVE) 220 MG tablet Take 1 tablet by mouth 2 (Two) Times a Day With Meals. HOLD PRIOR TO SURG   Past Month    Zolpidem Tartrate (AMBIEN PO) Take 5 mg by mouth At Night As Needed (SLEEP).   Past Month       Allergies:  Penicillins    Debilities:  None    Objective     Vital Signs  Temp:  [98.5 °F (36.9 °C)] 98.5 °F (36.9 °C)  Heart Rate:  [94] 94  Resp:  [16] 16  BP: (132)/(73) 132/73  No intake or output data in the 24 hours ending 01/10/25 1328       Physical Exam:     General Appearance:     Alert, cooperative, NAD   HEENT:     No trauma, pupils reactive, hearing intact   Back:      No CVA tenderness   Lungs:      Respirations unlabored, no wheezing    Heart:     RRR, intact peripheral pulses   Abdomen:      Soft, NDNT, no masses, no guarding   :     genitalia normal   Extremities:    No edema, no deformity   Lymphatic:    No neck or groin LAD   Skin:    No bleeding, bruising or rashes   Neuro/Psych:    Orientation intact, mood/affect pleasant, no focal findings     Results Review:     Results for orders placed or performed in visit on 01/03/25   Basic Metabolic Panel    Collection Time:  01/03/25 12:16 PM    Specimen: Blood   Result Value Ref Range    Glucose 167 (H) 65 - 99 mg/dL    BUN 23 8 - 23 mg/dL    Creatinine 0.98 0.57 - 1.00 mg/dL    Sodium 138 136 - 145 mmol/L    Potassium 3.7 3.5 - 5.2 mmol/L    Chloride 104 98 - 107 mmol/L    CO2 25.4 22.0 - 29.0 mmol/L    Calcium 9.4 8.6 - 10.5 mg/dL    BUN/Creatinine Ratio 23.5 7.0 - 25.0    Anion Gap 8.6 5.0 - 15.0 mmol/L    eGFR 56.7 (L) >60.0 mL/min/1.73   CBC (No Diff)    Collection Time: 01/03/25 12:16 PM    Specimen: Blood   Result Value Ref Range    WBC 8.93 3.40 - 10.80 10*3/mm3    RBC 4.33 3.77 - 5.28 10*6/mm3    Hemoglobin 13.7 12.0 - 15.9 g/dL    Hematocrit 40.3 34.0 - 46.6 %    MCV 93.1 79.0 - 97.0 fL    MCH 31.6 26.6 - 33.0 pg    MCHC 34.0 31.5 - 35.7 g/dL    RDW 12.2 (L) 12.3 - 15.4 %    RDW-SD 41.9 37.0 - 54.0 fl    MPV 9.2 6.0 - 12.0 fL    Platelets 245 140 - 450 10*3/mm3   ECG 12 Lead    Collection Time: 01/03/25 12:57 PM   Result Value Ref Range    QT Interval 427 ms    QTC Interval 420 ms       I reviewed the patient's prior history and old records to the best of my ability.   I have personally reviewed all pertinent imaging myself and their accompanying reports.   I have reviewed all labs.     Assessment:  Right renal calculus 2.3cm    Plan:    Right percutaneous nephrostolithotomy    I discussed the patient's findings and my recommendations with patient.   Risks, complications, outcomes and alternatives discussed with the patient at the bedside and office.    Aden Godinez MD  01/10/25  13:28 EST

## 2025-01-10 NOTE — PLAN OF CARE
Goal Outcome Evaluation:            Pt admitted to 3P from PACU, post op nephrostolithotomy. A/Ox4, RA, bedrest for 8 hours. Nephrostomy and price both with blood tinged drainage, right flank drsg CDI. Plan of care ongoing, VSS,  at bedside.

## 2025-01-10 NOTE — ANESTHESIA POSTPROCEDURE EVALUATION
Patient: Carol Dominguez    Procedure Summary       Date: 01/10/25 Room / Location: [facility] OR  [facility]    Anesthesia Start: 1427 Anesthesia Stop: 1606    Procedure: PERCUTANEOUS NEPHROSTOLITHOTOMY (Right) Diagnosis:       Staghorn calculus      (Staghorn calculus right kidney)    Surgeons: Aden Godinez MD Provider: Elaine Cuevas MD    Anesthesia Type: general ASA Status: 3            Anesthesia Type: general    Vitals  Vitals Value Taken Time   /75 01/10/25 1730   Temp 36.4 °C (97.5 °F) 01/10/25 1730   Pulse 57 01/10/25 1731   Resp 18 01/10/25 1730   SpO2 94 % 01/10/25 1731   Vitals shown include unfiled device data.        Post Anesthesia Care and Evaluation    Patient location during evaluation: PACU  Patient participation: complete - patient participated  Level of consciousness: awake  Pain management: adequate    Airway patency: patent  Anesthetic complications: No anesthetic complications    Cardiovascular status: acceptable  Respiratory status: acceptable  Hydration status: acceptable    Comments: /75 (BP Location: Right arm, Patient Position: Lying)   Pulse 57   Temp 36.4 °C (97.5 °F) (Oral)   Resp 18   SpO2 97%

## 2025-01-10 NOTE — OP NOTE
NEPHROLITHOTOMY PERCUTANEOUS WITH LASER  Procedure Note    Carol Dominguez  1/10/2025    Pre-op Diagnosis:   Staghorn calculus right kidney    Post-op Diagnosis:     Post-Op Diagnosis Codes:     * Staghorn calculus [N20.0]    Procedure(s):  PERCUTANEOUS NEPHROSTOLITHOTOMY    Surgeon(s):  Aden Godinez MD    Anesthesia: General    Staff:   Circulator: Bindu Vick RN  Radiology Technologist: Eula Oliveira  Scrub Person: Latanya Moore    Estimated Blood Loss: 100ml    Specimens:                Order Name Source Comment Collection Info Order Time   STONE ANALYSIS Kidney, Right  Collected By: Aden Godinez MD 1/10/2025  3:42 PM     Release to patient   Routine Release              Drains:   Nephrostomy Right (Active)   Dressing Status Clean;Dry;Intact 01/10/25 1540   Dressing Type Split gauze 01/10/25 1540   Collection Container Standard drainage bag 01/10/25 1540   Securement Method Tape 01/10/25 1540       Urethral Catheter Latex 16 Fr. (Active)       Findings: Staghorn calculus measuring up to 2.3 cm x 1.5 cm.  Involving the right renal pelvis.  Extension to the UPJ and proximal ureter.  Stone entirely removed.    Complications: None apparent    Indications: 84-year-old female with a large right renal calculus now presents for right percutaneous nephrostolithotomy.    Procedure: Patient was taken the operative suite after informed consent was obtained.  She was given general anesthesia.  Placed in a low lithotomy position frog-legged.  She was prepped and draped in sterile fashion.  Surgical timeout was performed.  The cystoscope inserted into her bladder.  The right orifice was cannulated with a guidewire passed up the right renal pelvis.  Using fluoroscopic guidance we passed up an occlusion catheter to the right UPJ the large stone was visible.  Retrograde pyelogram filled out the collecting system delineate the stone in the central pelvis of the kidney.  The UPJ occlusion catheter was  then insufflated with about 1-1/2 cc of contrast and anchored at the UPJ.  Angel catheter was placed after removal of the cystoscope.  She was then transferred back to the stretcher and then flipped over to a prone position arms above the head.  All layers were properly padded.  Chest rolls pelvic roll axillary support.  Anesthesia tended to her face.  She was secured to the table.  Her right flank was prepped and draped in a sterile fashion.  Surgical timeout was then again performed for second stage.  Using biplanar fluoroscopic imaging access to the right lower pole kidney was achieved with our approach at the posterior axillary line.  I saw no movement of her cecum.  Access was achieved I could feel the stone at the tip of the needle and an Amplatz wire was then passed on the renal pelvis.  A good drip was encountered.  I incised over the guidewire and a NephroMax balloon was then passed after we dilated with an 8 Turkish Amplatz dilator.  The NephroMax balloon was then insufflated to 14 silvia for 5 minutes.  An access sheath was then placed in the right kidney.  Rigid nephroscopy was performed.  The stone was immediately encountered.  Using the ultrasonic lithotripter we began breaking the stone up and suction all the fragments out until the stone was entirely broken up and removed.  We Steve fragments down into the UPJ but with the occlusion catheter and nothing migrated into the ureter.  I then placed the flexible nephroscope entirely visualize arrest the kidney specifically looking at the upper pole and could not appreciate any stones.  At this point I passed a Zebra wire down the UPJ and the occlusion catheter was removed.  We removed the Amplatz wire.  I then placed the rigid nephroscope again and began removing my access sheath and carefully visualized our tract to assure there was no evidence of any intraperitoneal perforation.  I did not see such.  Nor do I see any of her ascending colon.  Over the  guidewire a 24 Upper sorbian Malecot nephroureteral catheter was then passed and seated at the UPJ.  Antegrade nephrostogram showed excellent placement.  Some extravasation around the nephrostomy tube.  Nothing filling up the bowel.  It was anchored with a 2-0 silk.  Dressings were applied she was then flipped back over to a supine position and extubated and taken to recovery in stable condition.  Estimate blood loss about 100 cc.      Aden Godinez MD     Date: 1/10/2025  Time: 16:01 EST

## 2025-01-10 NOTE — ANESTHESIA PROCEDURE NOTES
Airway  Urgency: elective    Date/Time: 1/10/2025 2:39 PM  Difficult airway    General Information and Staff    Patient location during procedure: OR  Anesthesiologist: Elaine Cuevas MD  CRNA/CAA: Tobin Dominguez CRNA    Indications and Patient Condition  Indications for airway management: airway protection    Preoxygenated: yes  MILS not maintained throughout  Mask difficulty assessment: 1 - vent by mask    Final Airway Details  Final airway type: endotracheal airway      Successful airway: ETT  Cuffed: yes   Successful intubation technique: direct laryngoscopy  Facilitating devices/methods: Bougie and anterior pressure/BURP  Endotracheal tube insertion site: oral  Blade: Priscilla  Blade size: 3  ETT size (mm): 7.0  Cormack-Lehane Classification: grade III - view of epiglottis only  Placement verified by: capnometry   Cuff volume (mL): 8  Measured from: lips  ETT/EBT  to lips (cm): 21  Number of attempts at approach: 2  Assessment: lips, teeth, and gum same as pre-op and atraumatic intubation            
stretcher

## 2025-01-11 VITALS
DIASTOLIC BLOOD PRESSURE: 69 MMHG | HEART RATE: 79 BPM | HEIGHT: 66 IN | TEMPERATURE: 99.3 F | WEIGHT: 160 LBS | RESPIRATION RATE: 18 BRPM | OXYGEN SATURATION: 93 % | BODY MASS INDEX: 25.71 KG/M2 | SYSTOLIC BLOOD PRESSURE: 131 MMHG

## 2025-01-11 LAB
ANION GAP SERPL CALCULATED.3IONS-SCNC: 13.8 MMOL/L (ref 5–15)
BUN SERPL-MCNC: 17 MG/DL (ref 8–23)
BUN/CREAT SERPL: 17.5 (ref 7–25)
CALCIUM SPEC-SCNC: 7.9 MG/DL (ref 8.6–10.5)
CHLORIDE SERPL-SCNC: 105 MMOL/L (ref 98–107)
CO2 SERPL-SCNC: 22.2 MMOL/L (ref 22–29)
CREAT SERPL-MCNC: 0.97 MG/DL (ref 0.57–1)
DEPRECATED RDW RBC AUTO: 44.7 FL (ref 37–54)
EGFRCR SERPLBLD CKD-EPI 2021: 57.4 ML/MIN/1.73
ERYTHROCYTE [DISTWIDTH] IN BLOOD BY AUTOMATED COUNT: 12.5 % (ref 12.3–15.4)
GLUCOSE SERPL-MCNC: 146 MG/DL (ref 65–99)
HCT VFR BLD AUTO: 39 % (ref 34–46.6)
HGB BLD-MCNC: 12.4 G/DL (ref 12–15.9)
MCH RBC QN AUTO: 30.5 PG (ref 26.6–33)
MCHC RBC AUTO-ENTMCNC: 31.8 G/DL (ref 31.5–35.7)
MCV RBC AUTO: 95.8 FL (ref 79–97)
PLATELET # BLD AUTO: 240 10*3/MM3 (ref 140–450)
PMV BLD AUTO: 9.8 FL (ref 6–12)
POTASSIUM SERPL-SCNC: 4.1 MMOL/L (ref 3.5–5.2)
RBC # BLD AUTO: 4.07 10*6/MM3 (ref 3.77–5.28)
SODIUM SERPL-SCNC: 141 MMOL/L (ref 136–145)
WBC NRBC COR # BLD AUTO: 14.51 10*3/MM3 (ref 3.4–10.8)

## 2025-01-11 PROCEDURE — G0378 HOSPITAL OBSERVATION PER HR: HCPCS

## 2025-01-11 PROCEDURE — 80048 BASIC METABOLIC PNL TOTAL CA: CPT | Performed by: UROLOGY

## 2025-01-11 PROCEDURE — A9270 NON-COVERED ITEM OR SERVICE: HCPCS | Performed by: UROLOGY

## 2025-01-11 PROCEDURE — 63710000001 ATORVASTATIN 20 MG TABLET: Performed by: UROLOGY

## 2025-01-11 PROCEDURE — 85027 COMPLETE CBC AUTOMATED: CPT | Performed by: UROLOGY

## 2025-01-11 RX ORDER — CEPHALEXIN 500 MG/1
500 CAPSULE ORAL 2 TIMES DAILY
Qty: 14 CAPSULE | Refills: 0 | Status: SHIPPED | OUTPATIENT
Start: 2025-01-11 | End: 2025-01-18

## 2025-01-11 RX ORDER — OXYCODONE AND ACETAMINOPHEN 5; 325 MG/1; MG/1
1-2 TABLET ORAL EVERY 4 HOURS PRN
Qty: 12 TABLET | Refills: 0 | Status: SHIPPED | OUTPATIENT
Start: 2025-01-11 | End: 2025-01-14

## 2025-01-11 RX ADMIN — ATORVASTATIN CALCIUM 20 MG: 20 TABLET, FILM COATED ORAL at 09:45

## 2025-01-11 NOTE — DISCHARGE INSTRUCTIONS
[facility]    Home Care after: PERCUTANEOUS NEPHROSTOLITHOTOMY     Follow the guidelines below. Call your doctor if you notice any unusual symptoms. Remember: You are under the influence of medication. Do not drive, drink alcoholic beverages, sign legal documents or make major decisions during the next 24 hours.    Wound Care  You have an incision on your flank with a neprhrostomy tube intact   Leave the stitch in place until follow-up with your Urologist.  You might be able to feel some stitches, do not worry. Leave them alone!  You will have a dressing on the skin, please remove the dressing 24 hours after your surgery and replace with gauze as needed.   The wound may drain a little around the tube, this is NORMAL. You will need to change the gauze and dressing if there is drainage in order to keep this clean and dry.  Bloody urine may come through the tube and into the bag.  This is OKAY and expected.Drain bag as needed.    Activity  Plan at least 7-14 days off work, more if necessary, especially if your job requires heavy lifting or a lot of physical activities.  If you wish to return to work sooner, that is okay, but light-duty is recommended.  Sexual activity may resume in 4 weeks.  No jogging, tennis, heavy weight-lifting or prolonged physical activity for 2 weeks.  No driving while taking narcotic pain medication  You can shower 1 day after your surgery, but DO NOT SOAK in tub baths.  Avoid straining with bowel movements. Narcotics can cause constipation so you can take over-the-counter stool softeners (Senokot-S, Miralax, Colace) per the instructions on the box; hold these pills if you are experiencing diarrhea.       Return to Work/School  You may return to work/school at your physician's discretion.  If you need a note, please obtain from Dr. Godinez's office during your follow-up visit    Bathing  No submersion under water! No tub bath, hot tub, pool, lake, pond, creek, stream, river, etc.  Shower  starting 24 hours after surgery  Please shower with warm water, do not scrub incision site, okay to let water hit and run off, then pat dry.    Diet  Gradually resume regular diet, as tolerated.   Drinking plenty of water is very important.    Driving (if applicable)  No driving for 24 hours after surgery due to the anesthetic.  No driving anytime you are on pain medication.      Educational  The medication used to put you to sleep will be acting in your body for the next 24 hours, so you might feel a little sleepy. This feeling will slowly wear off. For the first 24 hours, you should NOT:   Drive a car, operate machinery, or power tools.  Drink any alcoholic drinks (even beer).   Make any important decisions, sign any important or legal papers.  For your safety, we strongly suggest that a responsible adult stay with you for the rest of the day and during the night after you leave the hospital.  Medication  You may take your usual medications unless told otherwise by your doctor.  Do not take any anticoagulation (Ibuprofen, Advil, Aspirin, Eliquis, Xarelto, Coumadin, etc) until cleared by your Doctor.  Narcotic pain medications can cause constipation. You may take an over-the-counter stool softener or laxative if needed.   A bowel movement every day is preferred, every other day is reasonable.      Call your Doctor if:  Call to notify your surgeon if you develop:  Fever greater than 101F  Unmanageable pain despite pain medication  The wound expands, feels hot, begins to ooze, or the pain does not decrease after 3 - 4 days  Pain medication not effective or makes you ill  Blood staining continues to worsen for more than 24 hours  Difficulty breathing or unusual shortness of breath, excessive bleeding, drainage at the operative site, fevers, chills, increased pain that is not relieved by pain medications, persistent nausea or vomiting    HOW TO REACH YOUR DOCTOR  Monday - Friday from 8:00 - 4:30, call the Urology  Office, [phone]  After hours, weekend and holidays call the [phone] or go to Emergency Room    Follow up care:   Keep your scheduled follow up with Dr. TAMAR Godinez, your nephrostomy tube will be removed at this appointment.   Please call [phone] with questions about your follow up Monday - Friday or to change your appointment.

## 2025-01-11 NOTE — PROGRESS NOTES
POD 1 s/p Right PCNL    Patient resting in bed,  at bedside  NAD    AVSS  Soft abdomen  Right neph tube intact, bloody output  Angel has been removed, awaiting first urination  Tolerating po  Pain controlled    WBC 14.5  Hgb 12.5  Cr 0.97    Discussed home instructions  Will keep follow up with Dr. Godinez next week for nephrostomy tube removal in office

## 2025-01-14 LAB
CALCIUM OXALATE DIHYDRATE MFR STONE IR: 90 %
COLOR STONE: NORMAL
COM MFR STONE: 10 %
COMPN STONE: NORMAL
LABORATORY COMMENT REPORT: NORMAL
LABORATORY COMMENT REPORT: NORMAL
Lab: NORMAL
Lab: NORMAL
PHOTO: NORMAL
SIZE STONE: NORMAL MM
SPEC SOURCE SUBJ: NORMAL
WT STONE: 906 MG

## 2025-02-19 ENCOUNTER — TRANSCRIBE ORDERS (OUTPATIENT)
Dept: ADMINISTRATIVE | Facility: HOSPITAL | Age: 85
End: 2025-02-19
Payer: MEDICARE

## 2025-02-19 DIAGNOSIS — Z12.31 BREAST CANCER SCREENING BY MAMMOGRAM: Primary | ICD-10-CM

## 2025-04-07 ENCOUNTER — HOSPITAL ENCOUNTER (OUTPATIENT)
Dept: MAMMOGRAPHY | Facility: HOSPITAL | Age: 85
Discharge: HOME | End: 2025-04-07
Admitting: INTERNAL MEDICINE
Payer: MEDICARE

## 2025-04-07 DIAGNOSIS — Z12.31 BREAST CANCER SCREENING BY MAMMOGRAM: ICD-10-CM

## 2025-04-07 PROCEDURE — 77063 BREAST TOMOSYNTHESIS BI: CPT

## 2025-04-07 PROCEDURE — 77067 SCR MAMMO BI INCL CAD: CPT

## (undated) DEVICE — GLV SURG TRIUMPH CLASSIC PF LTX 7 STRL

## (undated) DEVICE — EQUIPMENT COVER BAG TYPE 48” X 36” (122CM X 91CM): Brand: EQUIPMENT COVER BAG TYPE

## (undated) DEVICE — CONTAINER,SPECIMEN,OR STERILE,4OZ: Brand: MEDLINE

## (undated) DEVICE — GLV SURG BIOGEL LTX PF 7

## (undated) DEVICE — SYRINGE, LUER LOCK, 60ML: Brand: MEDLINE

## (undated) DEVICE — EXTENSION SET, MALE LUER LOCK ADAPTER WITH RETRACTABLE COLLAR

## (undated) DEVICE — PERCUTANEOUS TRACT KIT: Brand: PERCUTANEOUS TRACT KIT

## (undated) DEVICE — BAG,DRAINAGE,4L,A/R TOWER,LL,SLIDE TAP: Brand: MEDLINE

## (undated) DEVICE — INTENDED FOR TISSUE SEPARATION, AND OTHER PROCEDURES THAT REQUIRE A SHARP SURGICAL BLADE TO PUNCTURE OR CUT.: Brand: BARD-PARKER ® CARBON RIB-BACK BLADES

## (undated) DEVICE — THE TORRENT IRRIGATION SCOPE CONNECTOR IS USED WITH THE TORRENT IRRIGATION TUBING TO PROVIDE IRRIGATION FLUIDS SUCH AS STERILE WATER DURING GASTROINTESTINAL ENDOSCOPIC PROCEDURES WHEN USED IN CONJUNCTION WITH AN IRRIGATION PUMP (OR ELECTROSURGICAL UNIT).: Brand: TORRENT

## (undated) DEVICE — PROB LITHO SHOCKPULSE 1P/U

## (undated) DEVICE — TUBING, SUCTION, 1/4" X 10', STRAIGHT: Brand: MEDLINE

## (undated) DEVICE — CANNULA,ADULT,SOFT-TOUCH,7TUBE,SC: Brand: MEDLINE

## (undated) DEVICE — PK LAP GYN TOWER 40

## (undated) DEVICE — SET,IRRIGATION,CYSTO/TUR,90": Brand: MEDLINE

## (undated) DEVICE — ENDOPATH XCEL BLADELESS TROCARS WITH STABILITY SLEEVES: Brand: ENDOPATH XCEL

## (undated) DEVICE — SUT SILK 2/0 TIES 18IN A185H

## (undated) DEVICE — CONN TBG Y 5 IN 1 LF STRL

## (undated) DEVICE — SPNG GZ WOVN 4X4IN 12PLY 10/BX STRL

## (undated) DEVICE — TRAP SPECI TRANSPOSAL SYS

## (undated) DEVICE — TUBING, SUCTION, 1/4" X 20', STRAIGHT: Brand: MEDLINE INDUSTRIES, INC.

## (undated) DEVICE — APPL HEMOS FOR DELIVERY FLOSEAL

## (undated) DEVICE — ENDOCUT SCISSOR TIP, DISPOSABLE: Brand: RENEW

## (undated) DEVICE — 2, DISPOSABLE SUCTION/IRRIGATOR WITH DISPOSABLE TIP: Brand: STRYKEFLOW

## (undated) DEVICE — GW PTFE FIX/CORE JB .035 15X150CM

## (undated) DEVICE — ST IRR TUR 2NDARY 2SPIKE 26IN LF

## (undated) DEVICE — SKIN PREP TRAY 4 COMPARTM TRAY: Brand: MEDLINE INDUSTRIES, INC.

## (undated) DEVICE — HARMONIC ACE +7 LAPAROSCOPIC SHEARS ADVANCED HEMOSTASIS 5MM DIAMETER 36CM SHAFT LENGTH  FOR USE WITH GRAY HAND PIECE ONLY: Brand: HARMONIC ACE

## (undated) DEVICE — APPL CHLORAPREP HI/LITE 26ML ORNG

## (undated) DEVICE — DRAPE,TOWEL,LARGE,INVISISHIELD: Brand: MEDLINE

## (undated) DEVICE — SYR LUERLOK 20CC BX/50

## (undated) DEVICE — NITINOL WIRE WITH HYDROPHILIC TIP: Brand: SENSOR

## (undated) DEVICE — 3M™ STERI-STRIP™ REINFORCED ADHESIVE SKIN CLOSURES, R1547, 1/2 IN X 4 IN (12 MM X 100 MM), 6 STRIPS/ENVELOPE: Brand: 3M™ STERI-STRIP™

## (undated) DEVICE — PAD,ABDOMINAL,8"X10",ST,LF: Brand: MEDLINE

## (undated) DEVICE — LOU MINOR PROCEDURE: Brand: MEDLINE INDUSTRIES, INC.

## (undated) DEVICE — UNDYED BRAIDED (POLYGLACTIN 910), SYNTHETIC ABSORBABLE SUTURE: Brand: COATED VICRYL

## (undated) DEVICE — MAT FLR ABSORBENT LG 4FT 10 2.5FT

## (undated) DEVICE — GLV SURG SENSICARE MICRO PF LF 6.5 STRL

## (undated) DEVICE — ENDOPOUCH RETRIEVER SPECIMEN RETRIEVAL BAGS: Brand: ENDOPOUCH RETRIEVER

## (undated) DEVICE — CATH FOL SIMPLYLATEX SILELAST 16F 17IN

## (undated) DEVICE — PRT BIOP SEALS

## (undated) DEVICE — SUT SILK 0 PSL 18IN 580H

## (undated) DEVICE — ANTIBACTERIAL UNDYED BRAIDED (POLYGLACTIN 910), SYNTHETIC ABSORBABLE SURGICAL SUTURE: Brand: COATED VICRYL

## (undated) DEVICE — SYR LUERLOK 30CC

## (undated) DEVICE — DRAPE,NEPHROSCOPY,STERILE: Brand: MEDLINE

## (undated) DEVICE — JELLY,LUBE,STERILE,FLIP TOP,TUBE,4-OZ: Brand: MEDLINE

## (undated) DEVICE — SUT VIC 0 TN 27IN DYED JTN0G

## (undated) DEVICE — SYR LL TP 10ML STRL

## (undated) DEVICE — Device: Brand: DEFENDO AIR/WATER/SUCTION AND BIOPSY VALVE